# Patient Record
Sex: MALE | Race: WHITE | NOT HISPANIC OR LATINO | Employment: OTHER | URBAN - METROPOLITAN AREA
[De-identification: names, ages, dates, MRNs, and addresses within clinical notes are randomized per-mention and may not be internally consistent; named-entity substitution may affect disease eponyms.]

---

## 2019-10-25 ENCOUNTER — HOSPITAL ENCOUNTER (EMERGENCY)
Facility: HOSPITAL | Age: 84
Discharge: HOME | End: 2019-10-25
Attending: EMERGENCY MEDICINE | Admitting: EMERGENCY MEDICINE

## 2019-10-25 ENCOUNTER — APPOINTMENT (OUTPATIENT)
Dept: CT IMAGING | Facility: HOSPITAL | Age: 84
End: 2019-10-25

## 2019-10-25 VITALS
SYSTOLIC BLOOD PRESSURE: 151 MMHG | BODY MASS INDEX: 27.15 KG/M2 | WEIGHT: 173 LBS | TEMPERATURE: 97.2 F | HEIGHT: 67 IN | DIASTOLIC BLOOD PRESSURE: 66 MMHG | HEART RATE: 96 BPM | RESPIRATION RATE: 18 BRPM | OXYGEN SATURATION: 98 %

## 2019-10-25 DIAGNOSIS — S51.011A SKIN TEAR OF RIGHT ELBOW WITHOUT COMPLICATION, INITIAL ENCOUNTER: ICD-10-CM

## 2019-10-25 DIAGNOSIS — S00.01XA ABRASION OF SCALP, INITIAL ENCOUNTER: ICD-10-CM

## 2019-10-25 DIAGNOSIS — Z79.02 ANTIPLATELET OR ANTITHROMBOTIC LONG-TERM USE: ICD-10-CM
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
## 2019-10-25 DIAGNOSIS — S09.90XA CLOSED HEAD INJURY, INITIAL ENCOUNTER: Primary | ICD-10-CM

## 2019-10-25 PROCEDURE — 70450 CT HEAD/BRAIN W/O DYE: CPT

## 2019-10-25 PROCEDURE — 99284 EMERGENCY DEPT VISIT MOD MDM: CPT

## 2019-10-25 PROCEDURE — 90715 TDAP VACCINE 7 YRS/> IM: CPT | Performed by: EMERGENCY MEDICINE

## 2019-10-25 PROCEDURE — 72125 CT NECK SPINE W/O DYE: CPT

## 2019-10-25 PROCEDURE — 25010000002 TDAP 5-2.5-18.5 LF-MCG/0.5 SUSPENSION: Performed by: EMERGENCY MEDICINE

## 2019-10-25 PROCEDURE — 90471 IMMUNIZATION ADMIN: CPT | Performed by: EMERGENCY MEDICINE

## 2019-10-25 RX ADMIN — TETANUS TOXOID, REDUCED DIPHTHERIA TOXOID AND ACELLULAR PERTUSSIS VACCINE, ADSORBED 0.5 ML: 5; 2.5; 8; 8; 2.5 SUSPENSION INTRAMUSCULAR at 09:58

## 2019-10-25 NOTE — ED NOTES
Cervical collar removed. Pt denies pain. No distress noted at this time     Brooklynn Rodriguez, RN  10/25/19 6077

## 2019-10-25 NOTE — ED TRIAGE NOTES
Pt sent from [facility], staff found him on the ground, pt has hematoma on back of head, skin tear on right elbow. Staff states patient at neuro baseline. Pt is on plavix.

## 2019-10-25 NOTE — ED NOTES
Pt presented to ER per EMS. CC fall. Pt fell at facility; unwitnessed. Unknown LOC. Per family pt bed has been lowered incase of falls. Pt was found 0645 am by staff. Skin tear on right elbow with steri strips intact. No active bleeding noted. Abrasion noted to right posterior scalp. Tenderness noted. No CTL tenderness with palpation. Pt is AO per baseline per family. Pt had a stroke and has right side weakness; HOLGUIN +/=. Daughter at bedside     Brooklynn Rodriguez RN  10/25/19 7308

## 2019-10-25 NOTE — ED PROVIDER NOTES
EMERGENCY DEPARTMENT ENCOUNTER    Room Number:  12/12  Date of encounter:  10/25/2019  PCP: Edil Gilliam MD  Historian: Patient and patient's daughter      HPI:  Chief Complaint: Fall  A complete HPI/ROS/PMH/PSH/SH/FH are unobtainable due to: NA    Context: Jn Beauchamp is a 85 y.o. male who presents from [facility] to the ED for evaluation after reported fall. Patient found by nursing staff on the ground. Patient is unable to recall falling. He denies being in any pain or injuring himself however he presents with skin tear to right arm and hematoma to occipital scalp. Daughter at bedside reports patient's neuro status is at baseline. 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 Tdap not UTD.       PAST MEDICAL HISTORY  Active Ambulatory Problems     Diagnosis Date Noted   • No Active Ambulatory Problems     Resolved Ambulatory Problems     Diagnosis Date Noted   • No Resolved Ambulatory Problems     Past Medical History:   Diagnosis Date   • Cancer (CMS/HCC)    • Diabetes mellitus (CMS/HCC)    • Stroke (CMS/HCC)          PAST SURGICAL HISTORY  Past Surgical History:   Procedure Laterality Date   • ADENOIDECTOMY     • LUNG LOBECTOMY Right     RIGHT UPPER LOBE         FAMILY HISTORY  History reviewed. No pertinent family history.      SOCIAL HISTORY  Social History     Socioeconomic History   • Marital status: Single     Spouse name: Not on file   • Number of children: Not on file   • Years of education: Not on file   • Highest education level: Not on file   Tobacco Use   • Smoking status: Former Smoker   • Smokeless tobacco: Never Used   Substance and Sexual Activity   • Alcohol use: No     Frequency: Never   • Drug use: No   • Sexual activity: Not Currently         ALLERGIES  Patient has no known allergies.        REVIEW OF SYSTEMS  Review of Systems   All systems reviewed and negative except for those discussed in HPI.       PHYSICAL EXAM    I have reviewed the triage vital signs  and nursing notes.    ED Triage Vitals [10/25/19 0842]   Temp Heart Rate Resp BP SpO2   97.2 °F (36.2 °C) 99 18 (!) 192/90 99 %      Temp src Heart Rate Source Patient Position BP Location FiO2 (%)   Tympanic -- -- -- --       Physical Exam  GENERAL: not distressed  HENT: nares patent. Abrasion to right occiput  EYES: no scleral icterus  CV: regular rhythm, regular rate. No chest wall tenderness  RESPIRATORY: normal effort  ABDOMEN: soft, NT  MUSCULOSKELETAL: no deformity. Pelvis stable. No T or L spine tenderness. No pain with palpation to extremities.   NEURO: alert, moves all extremities, follows commands  SKIN: warm, dry. Skin tear to right elbow.       RADIOLOGY  Ct Head Without Contrast    Result Date: 10/25/2019  EMERGENCY NONCONTRAST HEAD CT AND NONCONTRAST CERVICAL SPINE CT 10/25/2019  CLINICAL HISTORY: Patient fell, hit head, abrasion to right occipital region, has headache and neck pain.  HEAD CT  TECHNIQUE: Spiral CT images were obtained from the base of the skull through the vertex without intravenous contrast. Images were reformatted and are submitted in 3 mm thick axial CT sections were brain algorithm. 2 mm thick axial CT sections with high-resolution bone algorithm and 2 mm thick sagittal and coronal reconstructions were performed and submitted in brain algorithm.  There are no prior head CTs for comparison.  FINDINGS: There are patchy areas of low-density extending from periventricular into the subcortical white matter of the cerebral hemispheres consistent with mild-to-moderate small vessel disease. There is a focal area of encephalomalacia in the superior left frontal gyrus compatible with an old superior left frontal lobe infarct measuring 13 x 10 mm in size in the distribution of the superior frontal branches of left middle cerebral artery territory and there is an additional 16 x 14 x 12 mm area of encephalomalacia in the superior lateral left occipital lobe in the distribution of the superior  lateral occipital branches of the inferior division left middle cerebral artery territory. There is a cystic area of encephalomalacia tracking throughout the left putamen, some extension into the anterior limb of the left internal capsule and the left external capsule and the area measures 3.6 x 1 x 1.9 cm in anteroposterior, mediolateral and craniocaudal dimension compatible with sequelae of an old infarct or hemorrhage at this site. There is diffuse cerebral atrophy via volume loss in the left side. The left lateral ventricle is larger than the right and ventricles are mildly prominent in size felt to be due to central volume loss or atrophy. I see no mass effect, no midline shift and no extra-axial fluid collections are identified. There is no evidence of acute intracranial hemorrhage. No acute skull fracture is identified. Paranasal sinuses and mastoid air cells and middle ear cavities are clear. Calcified plaques are present in the cavernous segments of the internal carotid arteries bilaterally as well as the intracranial segments of the distal vertebral arteries.       1. No acute abnormality is seen. Specifically no acute skull fracture or intracranial hemorrhage is identified.  2. There is mild-to-moderate small vessel disease in the cerebral white matter. There is a 13 x 10 mm old superior left frontal cortical infarct in the left middle cerebral artery territory and an additional 16 x 14 mm old superior lateral left occipital lobe infarct in the left middle cerebral artery territory. There is cystic area of encephalomalacia tracking throughout the majority of the left putamen into the anterior limb of the left internal capsule and left external capsule that measures 3.6 x 1 x 1.8 cm and is the sequela of an old infarct or old hemorrhage. Correlation with clinical history is suggested.  3. There is diffuse cerebral atrophy and prominent calcified plaques in the intracranial segments of the distal vertebral  arteries and cavernous and supracavernous segments of the internal carotid arteries bilaterally. The remainder of the head CT is within normal limits.   CERVICAL SPINE CT  TECHNIQUE: Spiral CT images were obtained from the skull base down to the T2 thoracic level and images were reformatted and submitted in 2 mm thick axial, sagittal and coronal CT sections with soft tissue algorithm and 1 mm thick axial, sagittal and coronal CT sections with high-resolution bone algorithm.  FINDINGS: The craniocervical junction is normal in appearance. There are arthritic changes at the atlantodental interval with marginal spurring off the anterior ring of C1 and the odontoid process. Otherwise, the C1-2 level is normal in appearance.  At C2-3 the disc space is normal. There is mild-to-moderate right facet overgrowth. The left facets and uncovertebral joints are normal and there is no canal or foraminal narrowing.  At C3-4 there is mild right and there is moderate left facet overgrowth, minimal posterior spurring and mild bilateral uncovertebral joint hypertrophy. There is mild canal and there is mild right and moderate left bony foraminal narrowing.  At C4-5 there is minimal left and there is moderate right facet overgrowth, mild posterior endplate spurring. There is mild canal and mild-to-moderate right bony foraminal narrowing.  At C5-6 there is minimal left and there is moderate-to-severe right facet overgrowth, mild disc space narrowing and degenerative endplate changes. Posterior disc osteophyte complex mild to moderately narrows the canal. There appears to be a left posterolateral to medial foraminal disc herniation that presses on the the left C6 nerve root as it extends from the cord toward the foramen, compresses the C6 nerve root as it enters the left foramen at C5-6. There is also moderate right bony foraminal narrowing.  At C6-7 there is calcification of the disc space, some bony bridging across the endplates that may  be degenerative in origin or possibly related to prior surgery. There is some calcification of the posterior longitudinal ligament along the midline of the posterior inferior body of C6 and posterior superior body of C7. There is mild-to-moderate narrowing of the central portion of the canal, some uncovertebral joint hypertrophy. The facets are normal. There is mild right and mild-to-moderate left bony foraminal narrowing.  At C7-T1 there is mild right facet overgrowth. The posterior disc margin is normal. There is no canal or foraminal narrowing.  No acute fracture seen in the cervical spine.  IMPRESSION: No acute fracture is seen in cervical spine. There is cervical spondylosis as described above.  The results were communicated to Dr. Elvin Trejo from the emergency room by telephone 10/25/2019 at 10:20 AM.  Radiation dose reduction techniques were utilized, including automated exposure control and exposure modulation based on body size.  This report was finalized on 10/25/2019 2:40 PM by Dr. Kenny Melgar M.D.      Ct Cervical Spine Without Contrast    Result Date: 10/25/2019  EMERGENCY NONCONTRAST HEAD CT AND NONCONTRAST CERVICAL SPINE CT 10/25/2019  CLINICAL HISTORY: Patient fell, hit head, abrasion to right occipital region, has headache and neck pain.  HEAD CT  TECHNIQUE: Spiral CT images were obtained from the base of the skull through the vertex without intravenous contrast. Images were reformatted and are submitted in 3 mm thick axial CT sections were brain algorithm. 2 mm thick axial CT sections with high-resolution bone algorithm and 2 mm thick sagittal and coronal reconstructions were performed and submitted in brain algorithm.  There are no prior head CTs for comparison.  FINDINGS: There are patchy areas of low-density extending from periventricular into the subcortical white matter of the cerebral hemispheres consistent with mild-to-moderate small vessel disease. There is a focal area of  encephalomalacia in the superior left frontal gyrus compatible with an old superior left frontal lobe infarct measuring 13 x 10 mm in size in the distribution of the superior frontal branches of left middle cerebral artery territory and there is an additional 16 x 14 x 12 mm area of encephalomalacia in the superior lateral left occipital lobe in the distribution of the superior lateral occipital branches of the inferior division left middle cerebral artery territory. There is a cystic area of encephalomalacia tracking throughout the left putamen, some extension into the anterior limb of the left internal capsule and the left external capsule and the area measures 3.6 x 1 x 1.9 cm in anteroposterior, mediolateral and craniocaudal dimension compatible with sequelae of an old infarct or hemorrhage at this site. There is diffuse cerebral atrophy via volume loss in the left side. The left lateral ventricle is larger than the right and ventricles are mildly prominent in size felt to be due to central volume loss or atrophy. I see no mass effect, no midline shift and no extra-axial fluid collections are identified. There is no evidence of acute intracranial hemorrhage. No acute skull fracture is identified. Paranasal sinuses and mastoid air cells and middle ear cavities are clear. Calcified plaques are present in the cavernous segments of the internal carotid arteries bilaterally as well as the intracranial segments of the distal vertebral arteries.       1. No acute abnormality is seen. Specifically no acute skull fracture or intracranial hemorrhage is identified.  2. There is mild-to-moderate small vessel disease in the cerebral white matter. There is a 13 x 10 mm old superior left frontal cortical infarct in the left middle cerebral artery territory and an additional 16 x 14 mm old superior lateral left occipital lobe infarct in the left middle cerebral artery territory. There is cystic area of encephalomalacia tracking  throughout the majority of the left putamen into the anterior limb of the left internal capsule and left external capsule that measures 3.6 x 1 x 1.8 cm and is the sequela of an old infarct or old hemorrhage. Correlation with clinical history is suggested.  3. There is diffuse cerebral atrophy and prominent calcified plaques in the intracranial segments of the distal vertebral arteries and cavernous and supracavernous segments of the internal carotid arteries bilaterally. The remainder of the head CT is within normal limits.   CERVICAL SPINE CT  TECHNIQUE: Spiral CT images were obtained from the skull base down to the T2 thoracic level and images were reformatted and submitted in 2 mm thick axial, sagittal and coronal CT sections with soft tissue algorithm and 1 mm thick axial, sagittal and coronal CT sections with high-resolution bone algorithm.  FINDINGS: The craniocervical junction is normal in appearance. There are arthritic changes at the atlantodental interval with marginal spurring off the anterior ring of C1 and the odontoid process. Otherwise, the C1-2 level is normal in appearance.  At C2-3 the disc space is normal. There is mild-to-moderate right facet overgrowth. The left facets and uncovertebral joints are normal and there is no canal or foraminal narrowing.  At C3-4 there is mild right and there is moderate left facet overgrowth, minimal posterior spurring and mild bilateral uncovertebral joint hypertrophy. There is mild canal and there is mild right and moderate left bony foraminal narrowing.  At C4-5 there is minimal left and there is moderate right facet overgrowth, mild posterior endplate spurring. There is mild canal and mild-to-moderate right bony foraminal narrowing.  At C5-6 there is minimal left and there is moderate-to-severe right facet overgrowth, mild disc space narrowing and degenerative endplate changes. Posterior disc osteophyte complex mild to moderately narrows the canal. There  appears to be a left posterolateral to medial foraminal disc herniation that presses on the the left C6 nerve root as it extends from the cord toward the foramen, compresses the C6 nerve root as it enters the left foramen at C5-6. There is also moderate right bony foraminal narrowing.  At C6-7 there is calcification of the disc space, some bony bridging across the endplates that may be degenerative in origin or possibly related to prior surgery. There is some calcification of the posterior longitudinal ligament along the midline of the posterior inferior body of C6 and posterior superior body of C7. There is mild-to-moderate narrowing of the central portion of the canal, some uncovertebral joint hypertrophy. The facets are normal. There is mild right and mild-to-moderate left bony foraminal narrowing.  At C7-T1 there is mild right facet overgrowth. The posterior disc margin is normal. There is no canal or foraminal narrowing.  No acute fracture seen in the cervical spine.  IMPRESSION: No acute fracture is seen in cervical spine. There is cervical spondylosis as described above.  The results were communicated to Dr. Elvin Trejo from the emergency room by telephone 10/25/2019 at 10:20 AM.  Radiation dose reduction techniques were utilized, including automated exposure control and exposure modulation based on body size.  This report was finalized on 10/25/2019 2:40 PM by Dr. Kenny Melgar M.D.        I ordered the above noted radiological studies. Reviewed by me and discussed with radiologist.  See dictation for official radiology interpretation.      PROCEDURES    Procedures      MEDICATIONS GIVEN IN ER    Medications   Tdap (BOOSTRIX) injection 0.5 mL (0.5 mL Intramuscular Given 10/25/19 0958)         PROGRESS, DATA ANALYSIS, CONSULTS, AND MEDICAL DECISION MAKING    All labs have been independently reviewed by me.  All radiology studies have been reviewed by me and discussed with radiologist dictating the  report.   EKG's independently viewed and interpreted by me.  Discussion below represents my analysis of pertinent findings related to patient's condition, differential diagnosis, treatment plan and final disposition.      ED Course as of Oct 25 1106   Fri Oct 25, 2019   0858 I interpreted the prehospital EKG.  This is obtained at 8:10 AM.  Sinus rhythm right bundle branch block.  Normal axis.  No acute ST changes.  [TD]   1102 I spoke to Dr. Mon who states that CT scan of the head and neck are acutely negative.  Patient does have a cervical disc herniation.  [TD]   1104 CT head interpreted by myself shows no evidence of any intracranial pathology.  [TD]      ED Course User Index  [TD] Elvin Trejo II, MD     I let the patient know about the incidental herniated disk in C spine. He states he has no neck pain. He reports good strength on exam and I rechecked his exam and he has excellent strength in arms at deltoids, biceps, triceps, and interosseus muscles.     AS OF 11:06 AM VITALS:    BP - 151/66  HR - 96  TEMP - 97.2 °F (36.2 °C) (Tympanic)  02 SATS - 98%        DIAGNOSIS  Final diagnoses:   Closed head injury, initial encounter   Fall, initial encounter   Abrasion of scalp, initial encounter   Antiplatelet or antithrombotic long-term use   Skin tear of right elbow without complication, initial encounter         DISPOSITION  DISCHARGE    Patient discharged in stable condition.    Reviewed implications of results, diagnosis, meds, responsibility to follow up, warning signs and symptoms of possible worsening, potential complications and reasons to return to ER.    Patient/Family voiced understanding of above instructions.    Discussed plan for discharge, as there is no emergent indication for admission. Patient referred to primary care provider for BP management due to today's BP. Pt/family is agreeable and understands need for follow up and repeat testing.  Pt is aware that discharge does not mean that  nothing is wrong but it indicates no emergency is present that requires admission and they must continue care with follow-up as given below or physician of their choice.     FOLLOW-UP  Edil Gilliam MD  [address]  [place] [id number] [phone]    Schedule an appointment as soon as possible for a visit   As needed         Medication List      No changes were made to your prescriptions during this visit.             Documentation assistance provided by blake Davis for Dr. Elvin Trejo MD.  Information recorded by the blake was done at my direction and has been verified and validated by me.           Cyndi Davis  10/25/19 1106       Elvin Trejo II, MD  10/26/19 5653

## 2020-09-10 ENCOUNTER — LAB REQUISITION (OUTPATIENT)
Dept: LAB | Facility: HOSPITAL | Age: 85
End: 2020-09-10

## 2020-09-10 DIAGNOSIS — Z00.00 ROUTINE GENERAL MEDICAL EXAMINATION AT A HEALTH CARE FACILITY: ICD-10-CM

## 2020-09-10 LAB
ALBUMIN SERPL-MCNC: 3.5 G/DL (ref 3.5–5.2)
ALBUMIN/GLOB SERPL: 1.4 G/DL
ALP SERPL-CCNC: 117 U/L (ref 39–117)
ALT SERPL W P-5'-P-CCNC: 11 U/L (ref 1–41)
ANION GAP SERPL CALCULATED.3IONS-SCNC: 11.4 MMOL/L (ref 5–15)
AST SERPL-CCNC: 8 U/L (ref 1–40)
BASOPHILS # BLD AUTO: 0.05 10*3/MM3 (ref 0–0.2)
BASOPHILS NFR BLD AUTO: 0.7 % (ref 0–1.5)
BILIRUB SERPL-MCNC: 0.3 MG/DL (ref 0–1.2)
BUN SERPL-MCNC: 20 MG/DL (ref 8–23)
BUN/CREAT SERPL: 16.8 (ref 7–25)
CALCIUM SPEC-SCNC: 9.4 MG/DL (ref 8.6–10.5)
CHLORIDE SERPL-SCNC: 103 MMOL/L (ref 98–107)
CO2 SERPL-SCNC: 26.6 MMOL/L (ref 22–29)
CREAT SERPL-MCNC: 1.19 MG/DL (ref 0.76–1.27)
DEPRECATED RDW RBC AUTO: 43.5 FL (ref 37–54)
EOSINOPHIL # BLD AUTO: 0.31 10*3/MM3 (ref 0–0.4)
EOSINOPHIL NFR BLD AUTO: 4.3 % (ref 0.3–6.2)
ERYTHROCYTE [DISTWIDTH] IN BLOOD BY AUTOMATED COUNT: 14 % (ref 12.3–15.4)
GFR SERPL CREATININE-BSD FRML MDRD: 58 ML/MIN/1.73
GLOBULIN UR ELPH-MCNC: 2.5 GM/DL
GLUCOSE SERPL-MCNC: 92 MG/DL (ref 65–99)
HCT VFR BLD AUTO: 36.1 % (ref 37.5–51)
HGB BLD-MCNC: 11 G/DL (ref 13–17.7)
IMM GRANULOCYTES # BLD AUTO: 0.01 10*3/MM3 (ref 0–0.05)
IMM GRANULOCYTES NFR BLD AUTO: 0.1 % (ref 0–0.5)
LYMPHOCYTES # BLD AUTO: 1.22 10*3/MM3 (ref 0.7–3.1)
LYMPHOCYTES NFR BLD AUTO: 16.8 % (ref 19.6–45.3)
MCH RBC QN AUTO: 25.5 PG (ref 26.6–33)
MCHC RBC AUTO-ENTMCNC: 30.5 G/DL (ref 31.5–35.7)
MCV RBC AUTO: 83.8 FL (ref 79–97)
MONOCYTES # BLD AUTO: 0.43 10*3/MM3 (ref 0.1–0.9)
MONOCYTES NFR BLD AUTO: 5.9 % (ref 5–12)
NEUTROPHILS NFR BLD AUTO: 5.23 10*3/MM3 (ref 1.7–7)
NEUTROPHILS NFR BLD AUTO: 72.2 % (ref 42.7–76)
NRBC BLD AUTO-RTO: 0 /100 WBC (ref 0–0.2)
PLATELET # BLD AUTO: 194 10*3/MM3 (ref 140–450)
PMV BLD AUTO: 12.5 FL (ref 6–12)
POTASSIUM SERPL-SCNC: 3.5 MMOL/L (ref 3.5–5.2)
PROT SERPL-MCNC: 6 G/DL (ref 6–8.5)
RBC # BLD AUTO: 4.31 10*6/MM3 (ref 4.14–5.8)
SODIUM SERPL-SCNC: 141 MMOL/L (ref 136–145)
WBC # BLD AUTO: 7.25 10*3/MM3 (ref 3.4–10.8)

## 2020-09-10 PROCEDURE — 80053 COMPREHEN METABOLIC PANEL: CPT

## 2020-09-10 PROCEDURE — 85025 COMPLETE CBC W/AUTO DIFF WBC: CPT

## 2020-11-22 ENCOUNTER — LAB REQUISITION (OUTPATIENT)
Dept: LAB | Facility: HOSPITAL | Age: 85
End: 2020-11-22

## 2020-11-22 DIAGNOSIS — Z00.00 ROUTINE GENERAL MEDICAL EXAMINATION AT A HEALTH CARE FACILITY: ICD-10-CM

## 2020-11-22 LAB
ALBUMIN SERPL-MCNC: 3.5 G/DL (ref 3.5–5.2)
ALBUMIN/GLOB SERPL: 1.2 G/DL
ALP SERPL-CCNC: 125 U/L (ref 39–117)
ALT SERPL W P-5'-P-CCNC: 8 U/L (ref 1–41)
ANION GAP SERPL CALCULATED.3IONS-SCNC: 21.7 MMOL/L (ref 5–15)
AST SERPL-CCNC: 7 U/L (ref 1–40)
BASOPHILS # BLD AUTO: 0.06 10*3/MM3 (ref 0–0.2)
BASOPHILS NFR BLD AUTO: 0.4 % (ref 0–1.5)
BILIRUB SERPL-MCNC: 0.3 MG/DL (ref 0–1.2)
BUN SERPL-MCNC: 70 MG/DL (ref 8–23)
BUN/CREAT SERPL: 17.6 (ref 7–25)
CALCIUM SPEC-SCNC: 9.4 MG/DL (ref 8.6–10.5)
CHLORIDE SERPL-SCNC: 111 MMOL/L (ref 98–107)
CO2 SERPL-SCNC: 22.3 MMOL/L (ref 22–29)
CREAT SERPL-MCNC: 3.98 MG/DL (ref 0.76–1.27)
DEPRECATED RDW RBC AUTO: 48.5 FL (ref 37–54)
EOSINOPHIL # BLD AUTO: 0 10*3/MM3 (ref 0–0.4)
EOSINOPHIL NFR BLD AUTO: 0 % (ref 0.3–6.2)
ERYTHROCYTE [DISTWIDTH] IN BLOOD BY AUTOMATED COUNT: 14.9 % (ref 12.3–15.4)
ERYTHROCYTE [SEDIMENTATION RATE] IN BLOOD: 58 MM/HR (ref 0–20)
GFR SERPL CREATININE-BSD FRML MDRD: 14 ML/MIN/1.73
GFR SERPL CREATININE-BSD FRML MDRD: 17 ML/MIN/1.73
GLOBULIN UR ELPH-MCNC: 2.9 GM/DL
GLUCOSE SERPL-MCNC: 526 MG/DL (ref 65–99)
HCT VFR BLD AUTO: 39.8 % (ref 37.5–51)
HGB BLD-MCNC: 11.6 G/DL (ref 13–17.7)
IMM GRANULOCYTES # BLD AUTO: 0.12 10*3/MM3 (ref 0–0.05)
IMM GRANULOCYTES NFR BLD AUTO: 0.8 % (ref 0–0.5)
LYMPHOCYTES # BLD AUTO: 1.11 10*3/MM3 (ref 0.7–3.1)
LYMPHOCYTES NFR BLD AUTO: 7.5 % (ref 19.6–45.3)
MCH RBC QN AUTO: 25.4 PG (ref 26.6–33)
MCHC RBC AUTO-ENTMCNC: 29.1 G/DL (ref 31.5–35.7)
MCV RBC AUTO: 87.1 FL (ref 79–97)
MONOCYTES # BLD AUTO: 0.56 10*3/MM3 (ref 0.1–0.9)
MONOCYTES NFR BLD AUTO: 3.8 % (ref 5–12)
NEUTROPHILS NFR BLD AUTO: 12.94 10*3/MM3 (ref 1.7–7)
NEUTROPHILS NFR BLD AUTO: 87.5 % (ref 42.7–76)
NRBC BLD AUTO-RTO: 0 /100 WBC (ref 0–0.2)
PLATELET # BLD AUTO: 369 10*3/MM3 (ref 140–450)
PMV BLD AUTO: 12.6 FL (ref 6–12)
POTASSIUM SERPL-SCNC: 4.6 MMOL/L (ref 3.5–5.2)
PROT SERPL-MCNC: 6.4 G/DL (ref 6–8.5)
RBC # BLD AUTO: 4.57 10*6/MM3 (ref 4.14–5.8)
SODIUM SERPL-SCNC: 155 MMOL/L (ref 136–145)
WBC # BLD AUTO: 14.79 10*3/MM3 (ref 3.4–10.8)

## 2020-11-22 PROCEDURE — 85025 COMPLETE CBC W/AUTO DIFF WBC: CPT

## 2020-11-22 PROCEDURE — 80053 COMPREHEN METABOLIC PANEL: CPT

## 2020-11-22 PROCEDURE — 85652 RBC SED RATE AUTOMATED: CPT

## 2021-02-06 ENCOUNTER — HOSPITAL ENCOUNTER (INPATIENT)
Facility: HOSPITAL | Age: 86
LOS: 6 days | Discharge: HOSPICE | End: 2021-02-12
Attending: EMERGENCY MEDICINE | Admitting: INTERNAL MEDICINE

## 2021-02-06 DIAGNOSIS — N39.0 ACUTE UTI: ICD-10-CM

## 2021-02-06 DIAGNOSIS — F03.91 DEMENTIA WITH BEHAVIORAL DISTURBANCE, UNSPECIFIED DEMENTIA TYPE: Primary | ICD-10-CM

## 2021-02-06 PROBLEM — F03.918 DEMENTIA WITH BEHAVIORAL DISTURBANCE (HCC): Status: ACTIVE | Noted: 2021-02-06

## 2021-02-06 LAB
ALBUMIN SERPL-MCNC: 3.7 G/DL (ref 3.5–5.2)
ALBUMIN/GLOB SERPL: 1.4 G/DL
ALP SERPL-CCNC: 108 U/L (ref 39–117)
ALT SERPL W P-5'-P-CCNC: 6 U/L (ref 1–41)
AMPHET+METHAMPHET UR QL: NEGATIVE
ANION GAP SERPL CALCULATED.3IONS-SCNC: 10.8 MMOL/L (ref 5–15)
AST SERPL-CCNC: 12 U/L (ref 1–40)
BACTERIA UR QL AUTO: ABNORMAL /HPF
BARBITURATES UR QL SCN: NEGATIVE
BASOPHILS # BLD AUTO: 0.07 10*3/MM3 (ref 0–0.2)
BASOPHILS NFR BLD AUTO: 0.8 % (ref 0–1.5)
BENZODIAZ UR QL SCN: NEGATIVE
BILIRUB SERPL-MCNC: <0.2 MG/DL (ref 0–1.2)
BILIRUB UR QL STRIP: NEGATIVE
BUN SERPL-MCNC: 35 MG/DL (ref 8–23)
BUN/CREAT SERPL: 17.1 (ref 7–25)
CALCIUM SPEC-SCNC: 9.5 MG/DL (ref 8.6–10.5)
CANNABINOIDS SERPL QL: NEGATIVE
CHLORIDE SERPL-SCNC: 109 MMOL/L (ref 98–107)
CLARITY UR: ABNORMAL
CO2 SERPL-SCNC: 24.2 MMOL/L (ref 22–29)
COCAINE UR QL: NEGATIVE
COLOR UR: YELLOW
CREAT SERPL-MCNC: 2.05 MG/DL (ref 0.76–1.27)
DEPRECATED RDW RBC AUTO: 46.2 FL (ref 37–54)
EOSINOPHIL # BLD AUTO: 0.19 10*3/MM3 (ref 0–0.4)
EOSINOPHIL NFR BLD AUTO: 2.1 % (ref 0.3–6.2)
ERYTHROCYTE [DISTWIDTH] IN BLOOD BY AUTOMATED COUNT: 14.7 % (ref 12.3–15.4)
ETHANOL BLD-MCNC: <10 MG/DL (ref 0–10)
ETHANOL UR QL: <0.01 %
GFR SERPL CREATININE-BSD FRML MDRD: 31 ML/MIN/1.73
GLOBULIN UR ELPH-MCNC: 2.7 GM/DL
GLUCOSE SERPL-MCNC: 224 MG/DL (ref 65–99)
GLUCOSE UR STRIP-MCNC: ABNORMAL MG/DL
HCT VFR BLD AUTO: 35.4 % (ref 37.5–51)
HGB BLD-MCNC: 11.1 G/DL (ref 13–17.7)
HGB UR QL STRIP.AUTO: ABNORMAL
HYALINE CASTS UR QL AUTO: ABNORMAL /LPF
IMM GRANULOCYTES # BLD AUTO: 0.04 10*3/MM3 (ref 0–0.05)
IMM GRANULOCYTES NFR BLD AUTO: 0.4 % (ref 0–0.5)
KETONES UR QL STRIP: NEGATIVE
LEUKOCYTE ESTERASE UR QL STRIP.AUTO: ABNORMAL
LYMPHOCYTES # BLD AUTO: 1.79 10*3/MM3 (ref 0.7–3.1)
LYMPHOCYTES NFR BLD AUTO: 19.5 % (ref 19.6–45.3)
MCH RBC QN AUTO: 26.9 PG (ref 26.6–33)
MCHC RBC AUTO-ENTMCNC: 31.4 G/DL (ref 31.5–35.7)
MCV RBC AUTO: 85.9 FL (ref 79–97)
METHADONE UR QL SCN: NEGATIVE
MONOCYTES # BLD AUTO: 0.49 10*3/MM3 (ref 0.1–0.9)
MONOCYTES NFR BLD AUTO: 5.3 % (ref 5–12)
NEUTROPHILS NFR BLD AUTO: 6.62 10*3/MM3 (ref 1.7–7)
NEUTROPHILS NFR BLD AUTO: 71.9 % (ref 42.7–76)
NITRITE UR QL STRIP: NEGATIVE
NRBC BLD AUTO-RTO: 0 /100 WBC (ref 0–0.2)
OPIATES UR QL: NEGATIVE
OXYCODONE UR QL SCN: NEGATIVE
PH UR STRIP.AUTO: <=5 [PH] (ref 5–8)
PLATELET # BLD AUTO: 234 10*3/MM3 (ref 140–450)
PMV BLD AUTO: 10.6 FL (ref 6–12)
POTASSIUM SERPL-SCNC: 4.5 MMOL/L (ref 3.5–5.2)
PROT SERPL-MCNC: 6.4 G/DL (ref 6–8.5)
PROT UR QL STRIP: ABNORMAL
RBC # BLD AUTO: 4.12 10*6/MM3 (ref 4.14–5.8)
RBC # UR: ABNORMAL /HPF
REF LAB TEST METHOD: ABNORMAL
SODIUM SERPL-SCNC: 144 MMOL/L (ref 136–145)
SP GR UR STRIP: 1.02 (ref 1–1.03)
SQUAMOUS #/AREA URNS HPF: ABNORMAL /HPF
UROBILINOGEN UR QL STRIP: ABNORMAL
WBC # BLD AUTO: 9.2 10*3/MM3 (ref 3.4–10.8)
WBC UR QL AUTO: ABNORMAL /HPF
YEAST URNS QL MICRO: ABNORMAL /HPF

## 2021-02-06 PROCEDURE — 81001 URINALYSIS AUTO W/SCOPE: CPT | Performed by: EMERGENCY MEDICINE

## 2021-02-06 PROCEDURE — 80307 DRUG TEST PRSMV CHEM ANLYZR: CPT | Performed by: EMERGENCY MEDICINE

## 2021-02-06 PROCEDURE — 85025 COMPLETE CBC W/AUTO DIFF WBC: CPT | Performed by: EMERGENCY MEDICINE

## 2021-02-06 PROCEDURE — 51798 US URINE CAPACITY MEASURE: CPT

## 2021-02-06 PROCEDURE — 99284 EMERGENCY DEPT VISIT MOD MDM: CPT

## 2021-02-06 PROCEDURE — 25010000002 CEFTRIAXONE PER 250 MG: Performed by: EMERGENCY MEDICINE

## 2021-02-06 PROCEDURE — 82077 ASSAY SPEC XCP UR&BREATH IA: CPT | Performed by: EMERGENCY MEDICINE

## 2021-02-06 PROCEDURE — 80053 COMPREHEN METABOLIC PANEL: CPT | Performed by: EMERGENCY MEDICINE

## 2021-02-06 PROCEDURE — P9612 CATHETERIZE FOR URINE SPEC: HCPCS

## 2021-02-06 PROCEDURE — U0004 COV-19 TEST NON-CDC HGH THRU: HCPCS | Performed by: EMERGENCY MEDICINE

## 2021-02-06 PROCEDURE — 87086 URINE CULTURE/COLONY COUNT: CPT | Performed by: EMERGENCY MEDICINE

## 2021-02-06 RX ORDER — SODIUM CHLORIDE 0.9 % (FLUSH) 0.9 %
10 SYRINGE (ML) INJECTION AS NEEDED
Status: DISCONTINUED | OUTPATIENT
Start: 2021-02-06 | End: 2021-02-12 | Stop reason: HOSPADM

## 2021-02-06 RX ORDER — CEFTRIAXONE SODIUM 1 G/50ML
1 INJECTION, SOLUTION INTRAVENOUS ONCE
Status: COMPLETED | OUTPATIENT
Start: 2021-02-06 | End: 2021-02-06

## 2021-02-06 RX ADMIN — CEFTRIAXONE SODIUM 1 G: 1 INJECTION, SOLUTION INTRAVENOUS at 23:09

## 2021-02-07 PROBLEM — K21.9 GERD (GASTROESOPHAGEAL REFLUX DISEASE): Status: ACTIVE | Noted: 2021-02-07

## 2021-02-07 PROBLEM — E11.65 TYPE 2 DIABETES MELLITUS WITH HYPERGLYCEMIA, WITHOUT LONG-TERM CURRENT USE OF INSULIN (HCC): Status: ACTIVE | Noted: 2021-02-07

## 2021-02-07 PROBLEM — I48.0 PAROXYSMAL ATRIAL FIBRILLATION (HCC): Status: ACTIVE | Noted: 2021-02-07

## 2021-02-07 PROBLEM — I73.9 PVD (PERIPHERAL VASCULAR DISEASE) (HCC): Status: ACTIVE | Noted: 2021-02-07

## 2021-02-07 PROBLEM — Z86.73 HISTORY OF CVA (CEREBROVASCULAR ACCIDENT): Status: ACTIVE | Noted: 2021-02-07

## 2021-02-07 PROBLEM — J44.9 COPD (CHRONIC OBSTRUCTIVE PULMONARY DISEASE) (HCC): Status: ACTIVE | Noted: 2021-02-07

## 2021-02-07 PROBLEM — E78.5 HLD (HYPERLIPIDEMIA): Status: ACTIVE | Noted: 2021-02-07

## 2021-02-07 LAB
ANION GAP SERPL CALCULATED.3IONS-SCNC: 10.5 MMOL/L (ref 5–15)
BUN SERPL-MCNC: 32 MG/DL (ref 8–23)
BUN/CREAT SERPL: 17.5 (ref 7–25)
CALCIUM SPEC-SCNC: 9.5 MG/DL (ref 8.6–10.5)
CHLORIDE SERPL-SCNC: 111 MMOL/L (ref 98–107)
CO2 SERPL-SCNC: 23.5 MMOL/L (ref 22–29)
CREAT SERPL-MCNC: 1.83 MG/DL (ref 0.76–1.27)
DEPRECATED RDW RBC AUTO: 45 FL (ref 37–54)
ERYTHROCYTE [DISTWIDTH] IN BLOOD BY AUTOMATED COUNT: 14.8 % (ref 12.3–15.4)
GFR SERPL CREATININE-BSD FRML MDRD: 35 ML/MIN/1.73
GLUCOSE BLDC GLUCOMTR-MCNC: 130 MG/DL (ref 70–130)
GLUCOSE BLDC GLUCOMTR-MCNC: 153 MG/DL (ref 70–130)
GLUCOSE BLDC GLUCOMTR-MCNC: 158 MG/DL (ref 70–130)
GLUCOSE BLDC GLUCOMTR-MCNC: 189 MG/DL (ref 70–130)
GLUCOSE BLDC GLUCOMTR-MCNC: 233 MG/DL (ref 70–130)
GLUCOSE BLDC GLUCOMTR-MCNC: 247 MG/DL (ref 70–130)
GLUCOSE BLDC GLUCOMTR-MCNC: 51 MG/DL (ref 70–130)
GLUCOSE BLDC GLUCOMTR-MCNC: 55 MG/DL (ref 70–130)
GLUCOSE SERPL-MCNC: 45 MG/DL (ref 65–99)
HBA1C MFR BLD: 8.4 % (ref 4.8–5.6)
HCT VFR BLD AUTO: 31.5 % (ref 37.5–51)
HGB BLD-MCNC: 10.5 G/DL (ref 13–17.7)
MCH RBC QN AUTO: 28 PG (ref 26.6–33)
MCHC RBC AUTO-ENTMCNC: 33.3 G/DL (ref 31.5–35.7)
MCV RBC AUTO: 84 FL (ref 79–97)
PLATELET # BLD AUTO: 224 10*3/MM3 (ref 140–450)
PMV BLD AUTO: 10.4 FL (ref 6–12)
POTASSIUM SERPL-SCNC: 3.4 MMOL/L (ref 3.5–5.2)
RBC # BLD AUTO: 3.75 10*6/MM3 (ref 4.14–5.8)
SARS-COV-2 ORF1AB RESP QL NAA+PROBE: NOT DETECTED
SODIUM SERPL-SCNC: 145 MMOL/L (ref 136–145)
WBC # BLD AUTO: 8.79 10*3/MM3 (ref 3.4–10.8)

## 2021-02-07 PROCEDURE — 83036 HEMOGLOBIN GLYCOSYLATED A1C: CPT | Performed by: NURSE PRACTITIONER

## 2021-02-07 PROCEDURE — 25010000002 CEFTRIAXONE PER 250 MG: Performed by: NURSE PRACTITIONER

## 2021-02-07 PROCEDURE — 82962 GLUCOSE BLOOD TEST: CPT

## 2021-02-07 PROCEDURE — 80048 BASIC METABOLIC PNL TOTAL CA: CPT | Performed by: NURSE PRACTITIONER

## 2021-02-07 PROCEDURE — 51798 US URINE CAPACITY MEASURE: CPT

## 2021-02-07 PROCEDURE — 85027 COMPLETE CBC AUTOMATED: CPT | Performed by: NURSE PRACTITIONER

## 2021-02-07 PROCEDURE — 63710000001 INSULIN LISPRO (HUMAN) PER 5 UNITS: Performed by: NURSE PRACTITIONER

## 2021-02-07 PROCEDURE — 63710000001 INSULIN GLARGINE PER 5 UNITS: Performed by: NURSE PRACTITIONER

## 2021-02-07 RX ORDER — AMLODIPINE BESYLATE 5 MG/1
5 TABLET ORAL DAILY
Status: DISCONTINUED | OUTPATIENT
Start: 2021-02-07 | End: 2021-02-12 | Stop reason: HOSPADM

## 2021-02-07 RX ORDER — CYPROHEPTADINE HYDROCHLORIDE 2 MG/5ML
2 SOLUTION ORAL EVERY 12 HOURS
COMMUNITY

## 2021-02-07 RX ORDER — CLOPIDOGREL BISULFATE 75 MG/1
75 TABLET ORAL NIGHTLY
COMMUNITY

## 2021-02-07 RX ORDER — ASPIRIN 81 MG/1
81 TABLET, CHEWABLE ORAL DAILY
Status: DISCONTINUED | OUTPATIENT
Start: 2021-02-07 | End: 2021-02-12 | Stop reason: HOSPADM

## 2021-02-07 RX ORDER — TAMSULOSIN HYDROCHLORIDE 0.4 MG/1
1 CAPSULE ORAL DAILY
COMMUNITY

## 2021-02-07 RX ORDER — OMEPRAZOLE 40 MG/1
40 CAPSULE, DELAYED RELEASE ORAL DAILY
COMMUNITY

## 2021-02-07 RX ORDER — INSULIN LISPRO 100 [IU]/ML
0-9 INJECTION, SOLUTION INTRAVENOUS; SUBCUTANEOUS
Status: DISCONTINUED | OUTPATIENT
Start: 2021-02-07 | End: 2021-02-12 | Stop reason: HOSPADM

## 2021-02-07 RX ORDER — ATORVASTATIN CALCIUM 20 MG/1
20 TABLET, FILM COATED ORAL NIGHTLY
COMMUNITY

## 2021-02-07 RX ORDER — CEFTRIAXONE SODIUM 1 G/50ML
1 INJECTION, SOLUTION INTRAVENOUS EVERY 24 HOURS
Status: COMPLETED | OUTPATIENT
Start: 2021-02-07 | End: 2021-02-09

## 2021-02-07 RX ORDER — ACETAMINOPHEN 160 MG/5ML
650 SOLUTION ORAL EVERY 4 HOURS PRN
Status: DISCONTINUED | OUTPATIENT
Start: 2021-02-07 | End: 2021-02-12 | Stop reason: HOSPADM

## 2021-02-07 RX ORDER — SODIUM CHLORIDE 0.9 % (FLUSH) 0.9 %
10 SYRINGE (ML) INJECTION AS NEEDED
Status: DISCONTINUED | OUTPATIENT
Start: 2021-02-07 | End: 2021-02-12 | Stop reason: HOSPADM

## 2021-02-07 RX ORDER — NICOTINE POLACRILEX 4 MG
15 LOZENGE BUCCAL
Status: DISCONTINUED | OUTPATIENT
Start: 2021-02-07 | End: 2021-02-12 | Stop reason: HOSPADM

## 2021-02-07 RX ORDER — BISACODYL 5 MG/1
5 TABLET, DELAYED RELEASE ORAL DAILY PRN
Status: DISCONTINUED | OUTPATIENT
Start: 2021-02-07 | End: 2021-02-12 | Stop reason: HOSPADM

## 2021-02-07 RX ORDER — PANTOPRAZOLE SODIUM 40 MG/1
40 TABLET, DELAYED RELEASE ORAL EVERY MORNING
Status: DISCONTINUED | OUTPATIENT
Start: 2021-02-07 | End: 2021-02-12 | Stop reason: HOSPADM

## 2021-02-07 RX ORDER — CLOPIDOGREL BISULFATE 75 MG/1
75 TABLET ORAL NIGHTLY
Status: DISCONTINUED | OUTPATIENT
Start: 2021-02-07 | End: 2021-02-12 | Stop reason: HOSPADM

## 2021-02-07 RX ORDER — METOPROLOL SUCCINATE 100 MG/1
100 TABLET, EXTENDED RELEASE ORAL 2 TIMES DAILY
COMMUNITY

## 2021-02-07 RX ORDER — ACETAMINOPHEN 650 MG/1
650 SUPPOSITORY RECTAL EVERY 4 HOURS PRN
Status: DISCONTINUED | OUTPATIENT
Start: 2021-02-07 | End: 2021-02-12 | Stop reason: HOSPADM

## 2021-02-07 RX ORDER — INSULIN GLARGINE 100 [IU]/ML
24 INJECTION, SOLUTION SUBCUTANEOUS NIGHTLY
Status: DISCONTINUED | OUTPATIENT
Start: 2021-02-07 | End: 2021-02-08

## 2021-02-07 RX ORDER — BISACODYL 10 MG
10 SUPPOSITORY, RECTAL RECTAL DAILY PRN
Status: DISCONTINUED | OUTPATIENT
Start: 2021-02-07 | End: 2021-02-12 | Stop reason: HOSPADM

## 2021-02-07 RX ORDER — DOCUSATE SODIUM 100 MG/1
100 CAPSULE, LIQUID FILLED ORAL 2 TIMES DAILY
Status: DISCONTINUED | OUTPATIENT
Start: 2021-02-07 | End: 2021-02-12 | Stop reason: HOSPADM

## 2021-02-07 RX ORDER — MIRTAZAPINE 15 MG/1
7.5 TABLET, FILM COATED ORAL NIGHTLY
COMMUNITY

## 2021-02-07 RX ORDER — METOPROLOL SUCCINATE 100 MG/1
100 TABLET, EXTENDED RELEASE ORAL 2 TIMES DAILY
Status: DISCONTINUED | OUTPATIENT
Start: 2021-02-07 | End: 2021-02-12 | Stop reason: HOSPADM

## 2021-02-07 RX ORDER — QUETIAPINE FUMARATE 25 MG/1
25 TABLET, FILM COATED ORAL NIGHTLY
Status: DISCONTINUED | OUTPATIENT
Start: 2021-02-07 | End: 2021-02-12 | Stop reason: HOSPADM

## 2021-02-07 RX ORDER — ONDANSETRON 4 MG/1
4 TABLET, FILM COATED ORAL EVERY 6 HOURS PRN
Status: DISCONTINUED | OUTPATIENT
Start: 2021-02-07 | End: 2021-02-12 | Stop reason: HOSPADM

## 2021-02-07 RX ORDER — DOCUSATE SODIUM 100 MG/1
100 CAPSULE, LIQUID FILLED ORAL 2 TIMES DAILY
COMMUNITY

## 2021-02-07 RX ORDER — ALUMINA, MAGNESIA, AND SIMETHICONE 2400; 2400; 240 MG/30ML; MG/30ML; MG/30ML
15 SUSPENSION ORAL EVERY 6 HOURS PRN
Status: DISCONTINUED | OUTPATIENT
Start: 2021-02-07 | End: 2021-02-12 | Stop reason: HOSPADM

## 2021-02-07 RX ORDER — AMLODIPINE BESYLATE 5 MG/1
5 TABLET ORAL DAILY
COMMUNITY

## 2021-02-07 RX ORDER — ASPIRIN 81 MG/1
81 TABLET, CHEWABLE ORAL DAILY
COMMUNITY

## 2021-02-07 RX ORDER — INSULIN GLARGINE 100 [IU]/ML
24 INJECTION, SOLUTION SUBCUTANEOUS 2 TIMES DAILY
COMMUNITY

## 2021-02-07 RX ORDER — QUETIAPINE FUMARATE 25 MG/1
25 TABLET, FILM COATED ORAL NIGHTLY
COMMUNITY

## 2021-02-07 RX ORDER — CHOLECALCIFEROL (VITAMIN D3) 125 MCG
1000 CAPSULE ORAL DAILY
COMMUNITY

## 2021-02-07 RX ORDER — TAMSULOSIN HYDROCHLORIDE 0.4 MG/1
0.4 CAPSULE ORAL DAILY
Status: DISCONTINUED | OUTPATIENT
Start: 2021-02-07 | End: 2021-02-12 | Stop reason: HOSPADM

## 2021-02-07 RX ORDER — MIRTAZAPINE 15 MG/1
7.5 TABLET, FILM COATED ORAL NIGHTLY
Status: DISCONTINUED | OUTPATIENT
Start: 2021-02-07 | End: 2021-02-12 | Stop reason: HOSPADM

## 2021-02-07 RX ORDER — ACETAMINOPHEN 325 MG/1
650 TABLET ORAL EVERY 4 HOURS PRN
Status: DISCONTINUED | OUTPATIENT
Start: 2021-02-07 | End: 2021-02-12 | Stop reason: HOSPADM

## 2021-02-07 RX ORDER — HALOPERIDOL 5 MG/ML
5 INJECTION INTRAMUSCULAR EVERY 6 HOURS PRN
Status: DISCONTINUED | OUTPATIENT
Start: 2021-02-07 | End: 2021-02-12 | Stop reason: HOSPADM

## 2021-02-07 RX ORDER — ONDANSETRON 2 MG/ML
4 INJECTION INTRAMUSCULAR; INTRAVENOUS EVERY 6 HOURS PRN
Status: DISCONTINUED | OUTPATIENT
Start: 2021-02-07 | End: 2021-02-12 | Stop reason: HOSPADM

## 2021-02-07 RX ORDER — HYDRALAZINE HYDROCHLORIDE 25 MG/1
25 TABLET, FILM COATED ORAL ONCE
Status: COMPLETED | OUTPATIENT
Start: 2021-02-08 | End: 2021-02-08

## 2021-02-07 RX ORDER — DEXTROSE MONOHYDRATE 25 G/50ML
25 INJECTION, SOLUTION INTRAVENOUS
Status: DISCONTINUED | OUTPATIENT
Start: 2021-02-07 | End: 2021-02-12 | Stop reason: HOSPADM

## 2021-02-07 RX ORDER — ATORVASTATIN CALCIUM 20 MG/1
20 TABLET, FILM COATED ORAL NIGHTLY
Status: DISCONTINUED | OUTPATIENT
Start: 2021-02-07 | End: 2021-02-12 | Stop reason: HOSPADM

## 2021-02-07 RX ADMIN — QUETIAPINE FUMARATE 25 MG: 25 TABLET ORAL at 21:04

## 2021-02-07 RX ADMIN — ATORVASTATIN CALCIUM 20 MG: 20 TABLET, FILM COATED ORAL at 21:03

## 2021-02-07 RX ADMIN — DEXTROSE MONOHYDRATE 25 G: 500 INJECTION PARENTERAL at 08:14

## 2021-02-07 RX ADMIN — DOCUSATE SODIUM 100 MG: 100 CAPSULE, LIQUID FILLED ORAL at 21:03

## 2021-02-07 RX ADMIN — MIRTAZAPINE 7.5 MG: 15 TABLET, FILM COATED ORAL at 21:04

## 2021-02-07 RX ADMIN — DEXTROSE MONOHYDRATE 25 G: 500 INJECTION PARENTERAL at 11:43

## 2021-02-07 RX ADMIN — INSULIN LISPRO 2 UNITS: 100 INJECTION, SOLUTION INTRAVENOUS; SUBCUTANEOUS at 17:39

## 2021-02-07 RX ADMIN — INSULIN GLARGINE 24 UNITS: 100 INJECTION, SOLUTION SUBCUTANEOUS at 03:59

## 2021-02-07 RX ADMIN — METOPROLOL SUCCINATE 100 MG: 100 TABLET, EXTENDED RELEASE ORAL at 21:04

## 2021-02-07 RX ADMIN — CEFTRIAXONE SODIUM 1 G: 1 INJECTION, SOLUTION INTRAVENOUS at 21:06

## 2021-02-07 RX ADMIN — CLOPIDOGREL 75 MG: 75 TABLET, FILM COATED ORAL at 21:04

## 2021-02-07 NOTE — PLAN OF CARE
Goal Outcome Evaluation:  Plan of Care Reviewed With: daughter  Progress: improving  Outcome Summary: Pt's BS was low this morning, Pt extremely agitated, combative, refusing any care. Pt required dextrose d/t low BS. Soft wrist restraints placed so that Pt's BS could be stabilized. Pt was no longer combative and was very cooperative and pleasant when his BS increased. Wrist restraints removed at 0930 and were not placed on again. Pt becoming agitated again, blood sugar was checked and was again low. Pt was given additional dextrose with good results. Pt ate lunch, was agreeable to a bath and then napped. Pt awoke this evening, friendly and cooperative and BS was 158. Pt remains confused, oriented to self only. Pt ate 100% of lunch and dinner and received 2units of sliding scale insulin after finishing dinner as I wanted to be sure Pt ate. Pt currently resting calmly and comfortably. Pt's dtr Oksana was called and updated on Pt's day, she expressed gratitude.

## 2021-02-07 NOTE — NURSING NOTE
Patient's blood glucose 45 this morning per lab draw and 55 by glucometer. He was uncompliant with attempts to give him oral glucose or iv D50. Soft wrist restraints placed per MD order and IV D50 given. Will monitor his BG and orientation.

## 2021-02-07 NOTE — ED NOTES
Pt here because he was having aggressive behaviors to another resident at his NH.  Pt very combative and aggressive at times. Pt alert to person only. Thinks it's 1941 and he is in [place].  I wore full protective equipment throughout this patient encounter including a face mask, eye shield and gloves. Hand hygiene/washing of hands was performed before donning protective equipment and after removal when leaving the room.       Colette Odonnell RN  02/06/21 5351

## 2021-02-07 NOTE — H&P
Patient Name:  Jn Beauchamp  YOB: 1934  MRN:  [id number]  Admit Date:  2/6/2021  Patient Care Team:  Edil Gilliam MD as PCP - General (Family Medicine)      Subjective   History Present Illness     Chief Complaint   Patient presents with   • Altered Mental Status   • Aggressive Behavior     History of Present Illness   Mr. Beauchamp is a 86 y.o. former smoker with a history of insulin-dependent type 2 diabetes, PVD, parastomal atrial fibrillation, HLD, CVA, GERD, dementia, and COPD that presents to [facility] due to aggressive behavior and altered mental status.  Due to patient mentation, HPI has been taken from ER documentation.  Patient became agitated tonight with the nursing home staff.  He actually took a swing at one of the nurses.  Patient also was combative and uncooperative with the ER nursing staff.  Patient has done this previously with acute UTIs.  UA obtained in the ED department is consistent with a UTI.  Patient was kept for the evaluation.    Review of Systems   Unable to perform ROS: Dementia        Personal History     Past Medical History:   Diagnosis Date   • Acute cystitis with hematuria    • Adult failure to thrive    • BPH (benign prostatic hyperplasia)    • Cancer (CMS/HCC)     LUNG    • Chronic kidney disease, stage 1    • Chronic obstructive pulmonary disease, unspecified (CMS/HCC)    • Cognitive communication deficit    • Diabetes mellitus (CMS/HCC)    • Dysphagia, oropharyngeal phase    • Elevated blood pressure reading without diagnosis of hypertension    • GERD (gastroesophageal reflux disease)    • Hemiplegia, unspecified affecting right nondominant side (CMS/HCC)    • Hyperlipidemia, unspecified    • Muscle weakness (generalized)    • Other abnormalities of gait and mobility    • Other specified abnormal findings of blood chemistry    • Paroxysmal atrial fibrillation (CMS/HCC)    • Peripheral vascular disease, unspecified (CMS/HCC)    •  Personal history of transient ischemic attack (TIA), and cerebral infarction without residual deficits    • Repeated falls    • Stroke (CMS/HCC)     RIGHT SIDE DEFICIT    • Type 2 diabetes mellitus with hypercholesterolemia (CMS/HCC)    • Unspecified abnormalities of gait and mobility    • Unspecified dementia with behavioral disturbance (CMS/HCC)    • Unspecified injury of head, subsequent encounter    • Urinary tract infection, site not specified      Past Surgical History:   Procedure Laterality Date   • ADENOIDECTOMY     • APPENDECTOMY     • CARDIAC CATHETERIZATION     • COLONOSCOPY     • CORONARY STENT PLACEMENT     • ENDOSCOPY     • EYE SURGERY     • LUNG LOBECTOMY Right     RIGHT UPPER LOBE     History reviewed. No pertinent family history.  Social History     Tobacco Use   • Smoking status: Former Smoker   • Smokeless tobacco: Never Used   Substance Use Topics   • Alcohol use: No     Frequency: Never   • Drug use: No     No current facility-administered medications on file prior to encounter.      Current Outpatient Medications on File Prior to Encounter   Medication Sig Dispense Refill   • amLODIPine (NORVASC) 5 MG tablet Take 5 mg by mouth Daily.     • aspirin 81 MG chewable tablet Chew 81 mg Daily.     • atorvastatin (LIPITOR) 20 MG tablet Take 20 mg by mouth Every Night.     • clopidogrel (PLAVIX) 75 MG tablet Take 75 mg by mouth Every Night.     • cyproheptadine 2 MG/5ML syrup Take 2 mg by mouth Every 12 (Twelve) Hours.     • docusate sodium (COLACE) 100 MG capsule Take 100 mg by mouth 2 (Two) Times a Day.     • insulin aspart (novoLOG FLEXPEN) 100 UNIT/ML solution pen-injector sc pen Inject 4 Units under the skin into the appropriate area as directed 3 (Three) Times a Day With Meals.     • Insulin Glargine (Lantus SoloStar) 100 UNIT/ML injection pen Inject 24 Units under the skin into the appropriate area as directed 2 (Two) Times a Day.     • metoprolol succinate XL (TOPROL-XL) 100 MG 24 hr tablet  Take 100 mg by mouth 2 (two) times a day.     • mirtazapine (REMERON) 15 MG tablet Take 7.5 mg by mouth Every Night.     • omeprazole (priLOSEC) 40 MG capsule Take 40 mg by mouth Daily.     • QUEtiapine (SEROquel) 25 MG tablet Take 25 mg by mouth Every Night.     • tamsulosin (FLOMAX) 0.4 MG capsule 24 hr capsule Take 1 capsule by mouth Daily.     • vitamin B-12 (CYANOCOBALAMIN) 500 MCG tablet Take 1,000 mcg by mouth Daily.       No Known Allergies    Objective    Objective     Vital Signs  Temp:  [98 °F (36.7 °C)-98.1 °F (36.7 °C)] 98 °F (36.7 °C)  Heart Rate:  [89-91] 91  Resp:  [16-18] 16  BP: (184-210)/(70-84) 184/70  SpO2:  [100 %] 100 %  on  Flow (L/min):  [4] 4;   Device (Oxygen Therapy): room air  Body mass index is 27.1 kg/m².    Physical Exam  Vitals signs and nursing note reviewed.   Constitutional:       General: He is sleeping. He is not in acute distress.     Appearance: He is well-developed. He is not toxic-appearing.      Comments: Chronically ill-appearing, sleeping but easily arousable   HENT:      Head: Normocephalic and atraumatic.   Eyes:      General: No scleral icterus.        Right eye: No discharge.         Left eye: No discharge.      Conjunctiva/sclera: Conjunctivae normal.   Neck:      Musculoskeletal: Normal range of motion and neck supple.      Vascular: No JVD.   Cardiovascular:      Rate and Rhythm: Normal rate and regular rhythm.      Heart sounds: Normal heart sounds. No murmur. No friction rub. No gallop.    Pulmonary:      Effort: Pulmonary effort is normal. No respiratory distress.      Breath sounds: Normal breath sounds. No wheezing or rales.   Abdominal:      General: Bowel sounds are normal. There is no distension.      Palpations: Abdomen is soft.      Tenderness: There is no abdominal tenderness. There is no guarding.   Musculoskeletal: Normal range of motion.         General: No tenderness or deformity.   Skin:     General: Skin is warm and dry.      Capillary Refill:  Capillary refill takes less than 2 seconds.   Neurological:      Mental Status: He is easily aroused. He is disoriented.   Psychiatric:         Behavior: Behavior normal. Behavior is cooperative.         Cognition and Memory: Cognition is impaired.      Comments: Patient very quiet on assessment       Results Review:  I reviewed the patient's new clinical results.  Discussed with ED provider.    Lab Results (last 24 hours)     Procedure Component Value Units Date/Time    Urinalysis With Microscopic If Indicated (No Culture) - Urine, Catheter In/Out [[id number]]  (Abnormal) Collected: 02/06/21 2133    Specimen: Urine, Catheter In/Out Updated: 02/06/21 2206     Color, UA Yellow     Appearance, UA Cloudy     pH, UA <=5.0     Specific Gravity, UA 1.021     Glucose, UA >=1000 mg/dL (3+)     Ketones, UA Negative     Bilirubin, UA Negative     Blood, UA Moderate (2+)     Protein, UA >=300 mg/dL (3+)     Leuk Esterase, UA Small (1+)     Nitrite, UA Negative     Urobilinogen, UA 0.2 E.U./dL    Urine Drug Screen - Urine, Catheter In/Out [[id number]]  (Normal) Collected: 02/06/21 2133    Specimen: Urine, Catheter In/Out Updated: 02/06/21 2206     Amphet/Methamphet, Screen Negative     Barbiturates Screen, Urine Negative     Benzodiazepine Screen, Urine Negative     Cocaine Screen, Urine Negative     Opiate Screen Negative     THC, Screen, Urine Negative     Methadone Screen, Urine Negative     Oxycodone Screen, Urine Negative    Narrative:      Negative Thresholds For Drugs Screened:     Amphetamines               500 ng/ml   Barbiturates               200 ng/ml   Benzodiazepines            100 ng/ml   Cocaine                    300 ng/ml   Methadone                  300 ng/ml   Opiates                    300 ng/ml   Oxycodone                  100 ng/ml   THC                        50 ng/ml    The Normal Value for all drugs tested is negative. This report includes final unconfirmed screening results to be used for medical  treatment purposes only. Unconfirmed results must not be used for non-medical purposes such as employment or legal testing. Clinical consideration should be applied to any drug of abuse test, particulary when unconfirmed results are used.    Urinalysis, Microscopic Only - Urine, Catheter In/Out [[id number]]  (Abnormal) Collected: 02/06/21 2133    Specimen: Urine, Catheter In/Out Updated: 02/06/21 2218     RBC, UA 0-2 /HPF      WBC, UA Too Numerous to Count /HPF      Bacteria, UA 2+ /HPF      Squamous Epithelial Cells, UA None Seen /HPF      Yeast, UA Small/1+ Yeast /HPF      Hyaline Casts, UA None Seen /LPF      Methodology Manual Light Microscopy    CBC & Differential [[id number]]  (Abnormal) Collected: 02/06/21 2143    Specimen: Blood Updated: 02/06/21 2156    Narrative:      The following orders were created for panel order CBC & Differential.  Procedure                               Abnormality         Status                     ---------                               -----------         ------                     CBC Auto Differential[[id number]]        Abnormal            Final result                 Please view results for these tests on the individual orders.    Comprehensive Metabolic Panel [[id number]]  (Abnormal) Collected: 02/06/21 2143    Specimen: Blood Updated: 02/06/21 2212     Glucose 224 mg/dL      BUN 35 mg/dL      Creatinine 2.05 mg/dL      Sodium 144 mmol/L      Potassium 4.5 mmol/L      Chloride 109 mmol/L      CO2 24.2 mmol/L      Calcium 9.5 mg/dL      Total Protein 6.4 g/dL      Albumin 3.70 g/dL      ALT (SGPT) 6 U/L      AST (SGOT) 12 U/L      Alkaline Phosphatase 108 U/L      Total Bilirubin <0.2 mg/dL      eGFR Non African Amer 31 mL/min/1.73      Globulin 2.7 gm/dL      A/G Ratio 1.4 g/dL      BUN/Creatinine Ratio 17.1     Anion Gap 10.8 mmol/L     Narrative:      GFR Normal >60  Chronic Kidney Disease <60  Kidney Failure <15      CBC Auto Differential [[id number]]  (Abnormal) Collected:  02/06/21 2143    Specimen: Blood Updated: 02/06/21 2156     WBC 9.20 10*3/mm3      RBC 4.12 10*6/mm3      Hemoglobin 11.1 g/dL      Hematocrit 35.4 %      MCV 85.9 fL      MCH 26.9 pg      MCHC 31.4 g/dL      RDW 14.7 %      RDW-SD 46.2 fl      MPV 10.6 fL      Platelets 234 10*3/mm3      Neutrophil % 71.9 %      Lymphocyte % 19.5 %      Monocyte % 5.3 %      Eosinophil % 2.1 %      Basophil % 0.8 %      Immature Grans % 0.4 %      Neutrophils, Absolute 6.62 10*3/mm3      Lymphocytes, Absolute 1.79 10*3/mm3      Monocytes, Absolute 0.49 10*3/mm3      Eosinophils, Absolute 0.19 10*3/mm3      Basophils, Absolute 0.07 10*3/mm3      Immature Grans, Absolute 0.04 10*3/mm3      nRBC 0.0 /100 WBC     Ethanol [[id number]] Collected: 02/06/21 2143    Specimen: Blood Updated: 02/06/21 2212     Ethanol <10 mg/dL      Ethanol % <0.010 %     COVID PRE-OP / PRE-PROCEDURE SCREENING ORDER (NO ISOLATION) - Swab, Nasopharynx [[id number]] Collected: 02/06/21 2323    Specimen: Swab from Nasopharynx Updated: 02/06/21 2328    Narrative:      The following orders were created for panel order COVID PRE-OP / PRE-PROCEDURE SCREENING ORDER (NO ISOLATION) - Swab, Nasopharynx.  Procedure                               Abnormality         Status                     ---------                               -----------         ------                     COVID-19,APTIMA PANTHER,...[[id number]]                      In process                   Please view results for these tests on the individual orders.    COVID-19,APTIMA PANTHER,[facility] IN-HOUSE, NP/OP SWAB IN UTM/VTM/SALINE TRANSPORT MEDIA,24 HR TAT - Swab, Nasopharynx [[id number]] Collected: 02/06/21 2323    Specimen: Swab from Nasopharynx Updated: 02/06/21 2328          Imaging Results (Last 24 Hours)     ** No results found for the last 24 hours. **               No orders to display        Assessment/Plan     Active Hospital Problems    Diagnosis  POA   • **Dementia with behavioral disturbance  (CMS/[facility]) [F03.91]  Yes   • Type 2 diabetes mellitus with hyperglycemia, without long-term current use of insulin (CMS/[facility]) [E11.65]  Yes   • History of CVA (cerebrovascular accident) [Z86.73]  Not Applicable   • PVD (peripheral vascular disease) (CMS/[facility]) [I73.9]  Yes   • Paroxysmal atrial fibrillation (CMS/[facility]) [I48.0]  Yes   • GERD (gastroesophageal reflux disease) [K21.9]  Yes   • HLD (hyperlipidemia) [E78.5]  Yes   • COPD (chronic obstructive pulmonary disease) (CMS/[facility]) [J44.9]  Yes      Resolved Hospital Problems   No resolved problems to display.       Mr. Beauchamp is a 86 y.o. former smoker who presents with dementia with behavior disturbances any urinary tract infection.    Dementia with behavioral disturbance/acute UTI  -Patient has been aggressive with the nursing home staff as well as the ER staff. Patient's UA is consistent with UTI which may be the culprit of his behavior disturbance.  Will continue Rocephin.  Urine cultures pending, will adjust antibiotics accordingly.  -Psych consultation  -Continue his home dose of Seroquel  -Continue to monitor    Type 2 diabetes mellitus  -Accu-Cheks 4 times a day with meals and at bedtime  -Moderate dose correctional factor with hypoglycemic protocol  -Check hemoglobin A1c  -Hold oral diabetic medication  -Resume basal insulin    Paroxysmal atrial fibrillation  -Rate control, resume Toprol-XL.  No oral anticoagulant noted on MAR    COPD  -No signs or symptoms of exacerbation on exam    Hyperlipidemia  -Continue statin therapy    GERD  -Continue PPI      I discussed the patient's findings and my recommendations with patient and ED provider.    VTE Prophylaxis - SCDs.  Code Status - Full code.       ALBERTINA Billings  [facility]  02/07/21  02:00 EST

## 2021-02-07 NOTE — ED NOTES
"Nursing report ED to floor  Jn Beauchamp  86 y.o.  male    HPI (triage note):   Chief Complaint   Patient presents with   • Altered Mental Status   • Aggressive Behavior       Admitting doctor:   Davian Rubio MD    Admitting diagnosis:   The primary encounter diagnosis was Dementia with behavioral disturbance, unspecified dementia type (CMS/HCC). A diagnosis of Acute UTI was also pertinent to this visit.    Code status:   Current Code Status     Date Active Code Status Order ID Comments User Context       Not on file    Advance Care Planning Activity          Allergies:   Patient has no known allergies.    Weight:       02/06/21 2324   Weight: 78.5 kg (173 lb)       Most recent vitals:   Vitals:    02/06/21 2047 02/06/21 2131 02/06/21 2324   BP: (!) 210/84 (!) 185/83    Pulse: 89     Resp: 18     Temp: 98.1 °F (36.7 °C)     SpO2: 100%     Weight:   78.5 kg (173 lb)   Height:   170.2 cm (67\")       Active LDAs/IV Access:   Lines, Drains & Airways    Active LDAs     Name:   Placement date:   Placement time:   Site:   Days:    Peripheral IV 02/06/21 2245 Right Antecubital   02/06/21 2245    Antecubital   less than 1                Labs (abnormal labs have a star):   Labs Reviewed   COMPREHENSIVE METABOLIC PANEL - Abnormal; Notable for the following components:       Result Value    Glucose 224 (*)     BUN 35 (*)     Creatinine 2.05 (*)     Chloride 109 (*)     eGFR Non  Amer 31 (*)     All other components within normal limits    Narrative:     GFR Normal >60  Chronic Kidney Disease <60  Kidney Failure <15     URINALYSIS W/ MICROSCOPIC IF INDICATED (NO CULTURE) - Abnormal; Notable for the following components:    Appearance, UA Cloudy (*)     Glucose, UA >=1000 mg/dL (3+) (*)     Blood, UA Moderate (2+) (*)     Protein, UA >=300 mg/dL (3+) (*)     Leuk Esterase, UA Small (1+) (*)     All other components within normal limits   CBC WITH AUTO DIFFERENTIAL - Abnormal; Notable for the following components:    RBC " 4.12 (*)     Hemoglobin 11.1 (*)     Hematocrit 35.4 (*)     MCHC 31.4 (*)     Lymphocyte % 19.5 (*)     All other components within normal limits   URINALYSIS, MICROSCOPIC ONLY - Abnormal; Notable for the following components:    WBC, UA Too Numerous to Count (*)     Bacteria, UA 2+ (*)     All other components within normal limits   URINE DRUG SCREEN - Normal    Narrative:     Negative Thresholds For Drugs Screened:     Amphetamines               500 ng/ml   Barbiturates               200 ng/ml   Benzodiazepines            100 ng/ml   Cocaine                    300 ng/ml   Methadone                  300 ng/ml   Opiates                    300 ng/ml   Oxycodone                  100 ng/ml   THC                        50 ng/ml    The Normal Value for all drugs tested is negative. This report includes final unconfirmed screening results to be used for medical treatment purposes only. Unconfirmed results must not be used for non-medical purposes such as employment or legal testing. Clinical consideration should be applied to any drug of abuse test, particulary when unconfirmed results are used.   COVID PRE-OP / PRE-PROCEDURE SCREENING ORDER (NO ISOLATION)    Narrative:     The following orders were created for panel order COVID PRE-OP / PRE-PROCEDURE SCREENING ORDER (NO ISOLATION) - Swab, Nasopharynx.  Procedure                               Abnormality         Status                     ---------                               -----------         ------                     COVID-19,APTIMA PANTHER,...[[id number]]                      In process                   Please view results for these tests on the individual orders.   COVID-19,APTIMA PANTHER,[facility] IN-HOUSE,NP/OP SWAB IN UTM/VTM/SALINE TRANSPORT MEDIA,24 HR TAT   ETHANOL   URINALYSIS W/ CULTURE IF INDICATED   CBC AND DIFFERENTIAL    Narrative:     The following orders were created for panel order CBC & Differential.  Procedure                               Abnormality          Status                     ---------                               -----------         ------                     CBC Auto Differential[[id number]]        Abnormal            Final result                 Please view results for these tests on the individual orders.       EKG:   No orders to display       Meds given in ED:   Medications   sodium chloride 0.9 % flush 10 mL (has no administration in time range)   cefTRIAXone (ROCEPHIN) IVPB 1 g (1 g Intravenous New Bag 2/6/21 6528)       Imaging results:  No radiology results for the last day    Ambulatory status:   - bedrest    Social issues:   Social History     Socioeconomic History   • Marital status: Single     Spouse name: Not on file   • Number of children: Not on file   • Years of education: Not on file   • Highest education level: Not on file   Tobacco Use   • Smoking status: Former Smoker   • Smokeless tobacco: Never Used   Substance and Sexual Activity   • Alcohol use: No     Frequency: Never   • Drug use: No   • Sexual activity: Not Currently    Nursing report ED to floor     Colette Odonnell RN  02/06/21 2468

## 2021-02-07 NOTE — CONSULTS
IDENTIFYING INFORMATION: The patient is an 86-year-old white male admitted with behavioral changes and increasing agitation possibly related to urinary tract infection    CHIEF COMPLAINT: None given     INFORMant: Chart, patient does not awaken for interview    RELIABILITY: Good    HISTORY OF PRESENT ILLNESS: The patient is an 86-year-old white male admitted after he had become aggressive with staff at his nursing facility.  according to the chart the patient has a history of dementia.  He took a swing at her nurse.  The patient has a history of similar agitation related to previous urinary tract infections.  On admission the patient's urine did look consistent with the area tract infection though culture is pending.  When seen today the patient is sleeping soundly does not awaken for interview.  He is currently prescribed psychiatric medications include Seroquel and Remeron.    PAST PSYCHIATRIC HISTORY: As above    PAST MEDICAL HISTORY: Significant for urinary tract infection, benign prostatic hypertrophy, chronic renal disease, chronic obstructive pulmonary disease, diabetes mellitus, dysphagia,, hypertension, GERD, hemiplegia, hyperlipidemia, paroxysmal atrial fibrillation, for peripheral vascular disease, history of TIAs,    MEDICATIONS:   Current Facility-Administered Medications   Medication Dose Route Frequency Provider Last Rate Last Admin   • acetaminophen (TYLENOL) tablet 650 mg  650 mg Oral Q4H PRN Bree Parr APRN        Or   • acetaminophen (TYLENOL) 160 MG/5ML solution 650 mg  650 mg Oral Q4H PRN Bree Parr APRN        Or   • acetaminophen (TYLENOL) suppository 650 mg  650 mg Rectal Q4H PRN Bree Parr APRN       • aluminum-magnesium hydroxide-simethicone (MAALOX MAX) 400-400-40 MG/5ML suspension 15 mL  15 mL Oral Q6H PRN Bree Parr APRN       • amLODIPine (NORVASC) tablet 5 mg  5 mg Oral Daily Bree Parr APRN       • aspirin chewable tablet 81 mg  81 mg  Oral Daily Bree Parr, ALBERTINA       • atorvastatin (LIPITOR) tablet 20 mg  20 mg Oral Nightly Bree Parr, APRLILIANA       • bisacodyl (DULCOLAX) EC tablet 5 mg  5 mg Oral Daily PRN Bree Parr, APRLILIANA       • bisacodyl (DULCOLAX) suppository 10 mg  10 mg Rectal Daily PRN Bree Parr, APRLILIANA       • cefTRIAXone (ROCEPHIN) IVPB 1 g  1 g Intravenous Q24H Bree Parr, ALBERTINA       • clopidogrel (PLAVIX) tablet 75 mg  75 mg Oral Nightly Bree Parr, APRN       • dextrose (D50W) 25 g/ 50mL Intravenous Solution 25 g  25 g Intravenous Q15 Min PRN Bree Parr APRN   25 g at 02/07/21 1143   • dextrose (GLUTOSE) oral gel 15 g  15 g Oral Q15 Min PRN Bree Parr, ALBERTINA       • docusate sodium (COLACE) capsule 100 mg  100 mg Oral BID Bree Parr, APRLILIANA       • glucagon (human recombinant) (GLUCAGEN DIAGNOSTIC) injection 1 mg  1 mg Subcutaneous Q15 Min PRN Bree Parr APRN       • insulin glargine (LANTUS, SEMGLEE) injection 24 Units  24 Units Subcutaneous Nightly rBee Parr APRN   24 Units at 02/07/21 0359   • insulin lispro (humaLOG, ADMELOG) injection 0-9 Units  0-9 Units Subcutaneous TID AC Bree Parr APRN       • metoprolol succinate XL (TOPROL-XL) 24 hr tablet 100 mg  100 mg Oral BID Bree Parr, ALBERTINA       • mirtazapine (REMERON) tablet 7.5 mg  7.5 mg Oral Nightly Bree Parr, ALBERTINA       • ondansetron (ZOFRAN) tablet 4 mg  4 mg Oral Q6H PRN Bree Parr, ALBERTINA        Or   • ondansetron (ZOFRAN) injection 4 mg  4 mg Intravenous Q6H PRN Bree Parr, APRLILIANA       • pantoprazole (PROTONIX) EC tablet 40 mg  40 mg Oral QAM Bree Parr, APRN       • QUEtiapine (SEROquel) tablet 25 mg  25 mg Oral Nightly Bree Parr, APRN       • sodium chloride 0.9 % flush 10 mL  10 mL Intravenous PRN Garrett Christie MD       • sodium chloride 0.9 % flush 10 mL  10 mL Intravenous PRN Bree Parr, APRN        • tamsulosin (FLOMAX) 24 hr capsule 0.4 mg  0.4 mg Oral Daily Bree Parr, APRN             ALLERGIES: None    FAMILY HISTORY: Noncontributory    SOCIAL HISTORY: The patient resides at a local nursing facility.  There is no reported use of alcohol tobacco or street drugs.    MENTAL STATUS EXAM: The patient is an elderly white male who is in soft wrist restraints.  He is sleeping soundly and does not awaken for interview.    ASSETS/LIABILITIES: To be assessed    DIAGNOSTIC IMPRESSION: Primary dementia Alzheimer's type with behavioral disturbance, superimposed delirium secondary to urinary tract infection, multiple medical problems described previously    PLAN: Given his history of previous episodes of agitation related to urinary tract infections, it is reasonable to believe that his current increased level of agitation is secondary to this infectious process.  I would recommend continuation of Seroquel and Remeron and will add as needed haloperidol for agitation.  The patient's mentation should return to baseline with resolution of his urinary tract infection.  I will continue to follow the patient with you.
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
## 2021-02-07 NOTE — PROGRESS NOTES
Attempted to call lab to ensure a urine culture was added but unfortunately there was no answer.  Spoke with nursing who will follow up to ensure that the urine culture is added to the already collected specimen.
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
## 2021-02-08 LAB
ANION GAP SERPL CALCULATED.3IONS-SCNC: 8.6 MMOL/L (ref 5–15)
BACTERIA SPEC AEROBE CULT: ABNORMAL
BASOPHILS # BLD AUTO: 0.07 10*3/MM3 (ref 0–0.2)
BASOPHILS NFR BLD AUTO: 1 % (ref 0–1.5)
BUN SERPL-MCNC: 28 MG/DL (ref 8–23)
BUN/CREAT SERPL: 16.9 (ref 7–25)
CALCIUM SPEC-SCNC: 9.2 MG/DL (ref 8.6–10.5)
CHLORIDE SERPL-SCNC: 109 MMOL/L (ref 98–107)
CO2 SERPL-SCNC: 24.4 MMOL/L (ref 22–29)
CREAT SERPL-MCNC: 1.66 MG/DL (ref 0.76–1.27)
DEPRECATED RDW RBC AUTO: 43.9 FL (ref 37–54)
EOSINOPHIL # BLD AUTO: 0.41 10*3/MM3 (ref 0–0.4)
EOSINOPHIL NFR BLD AUTO: 5.7 % (ref 0.3–6.2)
ERYTHROCYTE [DISTWIDTH] IN BLOOD BY AUTOMATED COUNT: 14.4 % (ref 12.3–15.4)
GFR SERPL CREATININE-BSD FRML MDRD: 39 ML/MIN/1.73
GLUCOSE BLDC GLUCOMTR-MCNC: 185 MG/DL (ref 70–130)
GLUCOSE BLDC GLUCOMTR-MCNC: 294 MG/DL (ref 70–130)
GLUCOSE BLDC GLUCOMTR-MCNC: 301 MG/DL (ref 70–130)
GLUCOSE BLDC GLUCOMTR-MCNC: 97 MG/DL (ref 70–130)
GLUCOSE SERPL-MCNC: 96 MG/DL (ref 65–99)
HCT VFR BLD AUTO: 36.1 % (ref 37.5–51)
HGB BLD-MCNC: 11.4 G/DL (ref 13–17.7)
IMM GRANULOCYTES # BLD AUTO: 0.03 10*3/MM3 (ref 0–0.05)
IMM GRANULOCYTES NFR BLD AUTO: 0.4 % (ref 0–0.5)
LYMPHOCYTES # BLD AUTO: 2.07 10*3/MM3 (ref 0.7–3.1)
LYMPHOCYTES NFR BLD AUTO: 28.6 % (ref 19.6–45.3)
MCH RBC QN AUTO: 26.6 PG (ref 26.6–33)
MCHC RBC AUTO-ENTMCNC: 31.6 G/DL (ref 31.5–35.7)
MCV RBC AUTO: 84.3 FL (ref 79–97)
MONOCYTES # BLD AUTO: 0.46 10*3/MM3 (ref 0.1–0.9)
MONOCYTES NFR BLD AUTO: 6.3 % (ref 5–12)
NEUTROPHILS NFR BLD AUTO: 4.21 10*3/MM3 (ref 1.7–7)
NEUTROPHILS NFR BLD AUTO: 58 % (ref 42.7–76)
NRBC BLD AUTO-RTO: 0 /100 WBC (ref 0–0.2)
PLATELET # BLD AUTO: 240 10*3/MM3 (ref 140–450)
PMV BLD AUTO: 11.1 FL (ref 6–12)
POTASSIUM SERPL-SCNC: 3.3 MMOL/L (ref 3.5–5.2)
RBC # BLD AUTO: 4.28 10*6/MM3 (ref 4.14–5.8)
SODIUM SERPL-SCNC: 142 MMOL/L (ref 136–145)
WBC # BLD AUTO: 7.25 10*3/MM3 (ref 3.4–10.8)

## 2021-02-08 PROCEDURE — 85025 COMPLETE CBC W/AUTO DIFF WBC: CPT | Performed by: INTERNAL MEDICINE

## 2021-02-08 PROCEDURE — 80048 BASIC METABOLIC PNL TOTAL CA: CPT | Performed by: INTERNAL MEDICINE

## 2021-02-08 PROCEDURE — 63710000001 INSULIN LISPRO (HUMAN) PER 5 UNITS: Performed by: NURSE PRACTITIONER

## 2021-02-08 PROCEDURE — 25010000002 CEFTRIAXONE PER 250 MG: Performed by: NURSE PRACTITIONER

## 2021-02-08 PROCEDURE — 82962 GLUCOSE BLOOD TEST: CPT

## 2021-02-08 RX ADMIN — METOPROLOL SUCCINATE 100 MG: 100 TABLET, EXTENDED RELEASE ORAL at 08:23

## 2021-02-08 RX ADMIN — CEFTRIAXONE SODIUM 1 G: 1 INJECTION, SOLUTION INTRAVENOUS at 20:17

## 2021-02-08 RX ADMIN — TAMSULOSIN HYDROCHLORIDE 0.4 MG: 0.4 CAPSULE ORAL at 08:23

## 2021-02-08 RX ADMIN — DOCUSATE SODIUM 100 MG: 100 CAPSULE, LIQUID FILLED ORAL at 08:23

## 2021-02-08 RX ADMIN — MIRTAZAPINE 7.5 MG: 15 TABLET, FILM COATED ORAL at 20:17

## 2021-02-08 RX ADMIN — QUETIAPINE FUMARATE 25 MG: 25 TABLET ORAL at 20:17

## 2021-02-08 RX ADMIN — ASPIRIN 81 MG: 81 TABLET, CHEWABLE ORAL at 08:23

## 2021-02-08 RX ADMIN — PANTOPRAZOLE SODIUM 40 MG: 40 TABLET, DELAYED RELEASE ORAL at 06:43

## 2021-02-08 RX ADMIN — CLOPIDOGREL 75 MG: 75 TABLET, FILM COATED ORAL at 20:17

## 2021-02-08 RX ADMIN — METOPROLOL SUCCINATE 100 MG: 100 TABLET, EXTENDED RELEASE ORAL at 20:17

## 2021-02-08 RX ADMIN — HYDRALAZINE HYDROCHLORIDE 25 MG: 25 TABLET, FILM COATED ORAL at 00:01

## 2021-02-08 RX ADMIN — ATORVASTATIN CALCIUM 20 MG: 20 TABLET, FILM COATED ORAL at 20:18

## 2021-02-08 RX ADMIN — INSULIN LISPRO 6 UNITS: 100 INJECTION, SOLUTION INTRAVENOUS; SUBCUTANEOUS at 11:32

## 2021-02-08 RX ADMIN — DOCUSATE SODIUM 100 MG: 100 CAPSULE, LIQUID FILLED ORAL at 20:19

## 2021-02-08 RX ADMIN — AMLODIPINE BESYLATE 5 MG: 5 TABLET ORAL at 08:23

## 2021-02-08 NOTE — CONSULTS
The patient is resting calmly today and does not awaken for interview.  The chart does not indicate any further episodes of aggressive behavior stating that the patient has been cooperative with care once his blood sugar had normalized.  I would expect his mentation to return to baseline with resolution of his urinary tract infection and stabilization of his blood glucose.

## 2021-02-08 NOTE — NURSING NOTE
"Nurse page [facility] re: pt's BP sustaining in the 177/80. PO hydrolazine 25 mg once given at 0001 AM, pt's BP still elevated. Awaiting call back.    [id number] Nurse spoke with Marilyn Lora which stated \"to continue to monitor pt's BP and call back if SBP greater than 180\".        "

## 2021-02-08 NOTE — PROGRESS NOTES
"Physicians Statement of Medical Necessity for  Ambulance Transportation    GENERAL INFORMATION     Name: Jn Beauchamp  YOB: 1934  Medicare #: [id number]  Transport Date:   Origin: [facility]   Destination: [facility]   Is the Patient's stay covered under Medicare Part A (PPS/DRG?)Yes  Closest appropriate facility? Yes  If this a hosp-hosp transfer? No  Is this a hospice patient? No    MEDICAL NECESSITY QUESTIONAIRE    Ambulance Transportation is medically necessary only if other means of transportation are contraindicated or would be potentially harmful to the patient.  To meet this requirement, the patient must be either \"bed confined\" or suffer from a condition such that transport by means other than an ambulance is contraindicated by the patient's condition.  The following questions must be answered by the healthcare professional signing below for this form to be valid:     1) Describe the MEDICAL CONDITION (physical and/or mental) of this patient AT THE TIME OF AMBULANCE TRANSPORT that requires the patient to be transported in an ambulance, and why transport by other means is contraindicated by the patient's condition:   Patient Active Problem List    Diagnosis   • *Dementia with behavioral disturbance (CMS/[facility]) [F03.91]   • Type 2 diabetes mellitus with hyperglycemia, without long-term current use of insulin (CMS/[facility]) [E11.65]   • History of CVA (cerebrovascular accident) [Z86.73]   • PVD (peripheral vascular disease) (CMS/[facility]) [I73.9]   • Paroxysmal atrial fibrillation (CMS/[facility]) [I48.0]   • GERD (gastroesophageal reflux disease) [K21.9]   • HLD (hyperlipidemia) [E78.5]   • COPD (chronic obstructive pulmonary disease) (CMS/[facility]) [J44.9]       Past Medical History:   Diagnosis Date   • Acute cystitis with hematuria    • Adult failure to thrive    • BPH (benign prostatic hyperplasia)    • Cancer (CMS/HCC)     LUNG    • Chronic kidney disease, stage 1    • Chronic obstructive " "pulmonary disease, unspecified (CMS/[facility])    • Cognitive communication deficit    • Diabetes mellitus (CMS/[facility])    • Dysphagia, oropharyngeal phase    • Elevated blood pressure reading without diagnosis of hypertension    • GERD (gastroesophageal reflux disease)    • Hemiplegia, unspecified affecting right nondominant side (CMS/HCC)    • Hyperlipidemia, unspecified    • Muscle weakness (generalized)    • Other abnormalities of gait and mobility    • Other specified abnormal findings of blood chemistry    • Paroxysmal atrial fibrillation (CMS/HCC)    • Peripheral vascular disease, unspecified (CMS/[facility])    • Personal history of transient ischemic attack (TIA), and cerebral infarction without residual deficits    • Repeated falls    • Stroke (CMS/[facility])     RIGHT SIDE DEFICIT    • Type 2 diabetes mellitus with hypercholesterolemia (CMS/[facility])    • Unspecified abnormalities of gait and mobility    • Unspecified dementia with behavioral disturbance (CMS/[facility])    • Unspecified injury of head, subsequent encounter    • Urinary tract infection, site not specified       Past Surgical History:   Procedure Laterality Date   • ADENOIDECTOMY     • APPENDECTOMY     • CARDIAC CATHETERIZATION     • COLONOSCOPY     • CORONARY STENT PLACEMENT     • ENDOSCOPY     • EYE SURGERY     • LUNG LOBECTOMY Right     RIGHT UPPER LOBE      2) Is this patient \"bed confined\" as defined below?Yes   To be \"bed confined\" the patient must satisfy all three of the following criteria:  (1) unable to get up from bed without assistance; AND (2) unable to ambulate;  AND (3) unable to sit in a chair or wheelchair.  3) Can this patient safely be transported by car or wheelchair van (I.e., may safely sit during transport, without an attendant or monitoring?)No   4. In addition to completing questions 1-3 above, please check any of the following conditions that apply*:          *Note: supporting documentation for any boxes checked must be maintained in the patient's " medical records Patient is confused      SIGNATURE OF PHYSICIAN OR OTHER AUTHORIZED HEALTHCARE PROFESSIONAL    I certify that the above information is true and correct based on my evaluation of this patient, and represent that the patient requires transport by ambulance and that other forms of transport are contraindicated.  I understand that this information will be used by the Centers for Medicare and Medicaid Services (CMS) to support the determiniation of medical necessity for ambulance services, and I represent that I have personal knowledge of the patient's condition at the time of transport.       If this box is checked, I also certify that the patient is physically or mentally incapable of signing the ambulance service's claim form and that the institution with which I am affiliated has furnished care, services or assistance to the patient.  My signature below is made on behalf of the patient pursuant to 42 .36(b)(4). In accordance with 42 .37, the specific reason(s) that the patient is physically or mentally incapable of signing the claim for is as follows:     Signature of Physician or Healthcare Professional  Date/Time:        (For Scheduled repetitive transport, this form is not valid for transports performed more than 60 days after this date).                                                                                                                                            --------------------------------------------------------------------------------------------  Printed Name and Credentials of Physician or Authorized Healthcare Professional     *Form must be signed by patient's attending physician for scheduled, repetitive transports,.  For non-repetitive ambulance transports, if unable to obtain the signature of the attending physician, any of the following may sign (please select below):     Physician  Clinical Nurse Specialist  Registered Nurse     Physician Assistant   Discharge Planner  Licensed Practical Nurse     Nurse Practitioner

## 2021-02-08 NOTE — PLAN OF CARE
Goal Outcome Evaluation:  Plan of Care Reviewed With: patient  Progress: no change     Pt's confused, alert to self and cooperative. Bed alarm on for safety. Nurse held 24 U Lantus last night bc pt's BS dropped 50 the day prior. Nurse monitored BS throughout the night, this morning BS is 97, orange juice given and holding slading scale dose this AM. Pt's BP elevated throughout the night: scheduled dose of 100 mg metoprolol given, and PO 25 mg hydralazine given at midnight, not effective. Per attending to call back if SBP greater than 180. Nurse will continue to monitor.

## 2021-02-08 NOTE — PROGRESS NOTES
Name: Jn Beauchamp ADMIT: 2021   : 1934  PCP: Edil Gilliam MD    MRN: [id number] LOS: 2 days   AGE/SEX: 86 y.o. male  ROOM: [facility]     Subjective   Subjective     Patient seen at bedside, lying in bed, appears very sleepy.       Objective   Objective   Vital Signs  Temp:  [97.2 °F (36.2 °C)-98.9 °F (37.2 °C)] 98.9 °F (37.2 °C)  Heart Rate:  [67-91] 74  Resp:  [16-18] 16  BP: (113-205)/(53-98) 113/58  SpO2:  [96 %-100 %] 99 %  on   ;   Device (Oxygen Therapy): room air  Body mass index is 27.1 kg/m².  Physical Exam    General:  appears sedated  Head and ENT: normocephalic and atraumatic.  Lungs: symmetric expansion and equal air entry bilaterally.  Heart: regular rate, rhythm, no murmurs.  Abdomen: soft, nontender, bowel sounds present.  Extremities:  No clubbing, cyanosis or edema.  Neurologic:   unable to evaluate because of altered mental status  Psychiatry:   unable to evaluate because of altered mental status  Skin:  Warm and no rash.    Results Review     I reviewed the patient's new clinical results.  Results from last 7 days   Lab Units 21  0521  0607 21  2143   WBC 10*3/mm3 7.25 8.79 9.20   HEMOGLOBIN g/dL 11.4* 10.5* 11.1*   PLATELETS 10*3/mm3 240 224 234     Results from last 7 days   Lab Units 21  0521  0607 21  2143   SODIUM mmol/L 142 145 144   POTASSIUM mmol/L 3.3* 3.4* 4.5   CHLORIDE mmol/L 109* 111* 109*   CO2 mmol/L 24.4 23.5 24.2   BUN mg/dL 28* 32* 35*   CREATININE mg/dL 1.66* 1.83* 2.05*   GLUCOSE mg/dL 96 45* 224*   Estimated Creatinine Clearance: 35.5 mL/min (A) (by C-G formula based on SCr of 1.66 mg/dL (H)).  Results from last 7 days   Lab Units 21  2143   ALBUMIN g/dL 3.70   BILIRUBIN mg/dL <0.2   ALK PHOS U/L 108   AST (SGOT) U/L 12   ALT (SGPT) U/L 6     Results from last 7 days   Lab Units 21  0521 21  0607 21  2143   CALCIUM mg/dL 9.2 9.5 9.5   ALBUMIN g/dL  --   --  3.70       COVID19   Date  Value Ref Range Status   02/06/2021 Not Detected Not Detected - Ref. Range Final     Hemoglobin A1C   Date/Time Value Ref Range Status   02/07/2021 0607 8.40 (H) 4.80 - 5.60 % Final     Glucose   Date/Time Value Ref Range Status   02/08/2021 1126 294 (H) 70 - 130 mg/dL Final   02/08/2021 0550 97 70 - 130 mg/dL Final   02/07/2021 2250 233 (H) 70 - 130 mg/dL Final   02/07/2021 2006 247 (H) 70 - 130 mg/dL Final   02/07/2021 1631 158 (H) 70 - 130 mg/dL Final   02/07/2021 1321 153 (H) 70 - 130 mg/dL Final   02/07/2021 1208 189 (H) 70 - 130 mg/dL Final       CT Cervical Spine Without Contrast  Narrative: EMERGENCY NONCONTRAST HEAD CT AND NONCONTRAST CERVICAL SPINE CT  10/25/2019     CLINICAL HISTORY: Patient fell, hit head, abrasion to right occipital  region, has headache and neck pain.     HEAD CT     TECHNIQUE: Spiral CT images were obtained from the base of the skull  through the vertex without intravenous contrast. Images were reformatted  and are submitted in 3 mm thick axial CT sections were brain algorithm.  2 mm thick axial CT sections with high-resolution bone algorithm and 2  mm thick sagittal and coronal reconstructions were performed and  submitted in brain algorithm.     There are no prior head CTs for comparison.     FINDINGS: There are patchy areas of low-density extending from  periventricular into the subcortical white matter of the cerebral  hemispheres consistent with mild-to-moderate small vessel disease. There  is a focal area of encephalomalacia in the superior left frontal gyrus  compatible with an old superior left frontal lobe infarct measuring 13 x  10 mm in size in the distribution of the superior frontal branches of  left middle cerebral artery territory and there is an additional 16 x 14  x 12 mm area of encephalomalacia in the superior lateral left occipital  lobe in the distribution of the superior lateral occipital branches of  the inferior division left middle cerebral artery territory.  There is a  cystic area of encephalomalacia tracking throughout the left putamen,  some extension into the anterior limb of the left internal capsule and  the left external capsule and the area measures 3.6 x 1 x 1.9 cm in  anteroposterior, mediolateral and craniocaudal dimension compatible with  sequelae of an old infarct or hemorrhage at this site. There is diffuse  cerebral atrophy via volume loss in the left side. The left lateral  ventricle is larger than the right and ventricles are mildly prominent  in size felt to be due to central volume loss or atrophy. I see no mass  effect, no midline shift and no extra-axial fluid collections are  identified. There is no evidence of acute intracranial hemorrhage. No  acute skull fracture is identified. Paranasal sinuses and mastoid air  cells and middle ear cavities are clear. Calcified plaques are present  in the cavernous segments of the internal carotid arteries bilaterally  as well as the intracranial segments of the distal vertebral arteries.     Impression:    1. No acute abnormality is seen. Specifically no acute skull fracture or  intracranial hemorrhage is identified.     2. There is mild-to-moderate small vessel disease in the cerebral white  matter. There is a 13 x 10 mm old superior left frontal cortical infarct  in the left middle cerebral artery territory and an additional 16 x 14  mm old superior lateral left occipital lobe infarct in the left middle  cerebral artery territory. There is cystic area of encephalomalacia  tracking throughout the majority of the left putamen into the anterior  limb of the left internal capsule and left external capsule that  measures 3.6 x 1 x 1.8 cm and is the sequela of an old infarct or old  hemorrhage. Correlation with clinical history is suggested.     3. There is diffuse cerebral atrophy and prominent calcified plaques in  the intracranial segments of the distal vertebral arteries and cavernous  and supracavernous  segments of the internal carotid arteries  bilaterally. The remainder of the head CT is within normal limits.        CERVICAL SPINE CT      TECHNIQUE: Spiral CT images were obtained from the skull base down to  the T2 thoracic level and images were reformatted and submitted in 2 mm  thick axial, sagittal and coronal CT sections with soft tissue algorithm  and 1 mm thick axial, sagittal and coronal CT sections with  high-resolution bone algorithm.     FINDINGS: The craniocervical junction is normal in appearance. There are  arthritic changes at the atlantodental interval with marginal spurring  off the anterior ring of C1 and the odontoid process. Otherwise, the  C1-2 level is normal in appearance.     At C2-3 the disc space is normal. There is mild-to-moderate right facet  overgrowth. The left facets and uncovertebral joints are normal and  there is no canal or foraminal narrowing.     At C3-4 there is mild right and there is moderate left facet overgrowth,  minimal posterior spurring and mild bilateral uncovertebral joint  hypertrophy. There is mild canal and there is mild right and moderate  left bony foraminal narrowing.     At C4-5 there is minimal left and there is moderate right facet  overgrowth, mild posterior endplate spurring. There is mild canal and  mild-to-moderate right bony foraminal narrowing.     At C5-6 there is minimal left and there is moderate-to-severe right  facet overgrowth, mild disc space narrowing and degenerative endplate  changes. Posterior disc osteophyte complex mild to moderately narrows  the canal. There appears to be a left posterolateral to medial foraminal  disc herniation that presses on the the left C6 nerve root as it extends  from the cord toward the foramen, compresses the C6 nerve root as it  enters the left foramen at C5-6. There is also moderate right bony  foraminal narrowing.     At C6-7 there is calcification of the disc space, some bony bridging  across the endplates  that may be degenerative in origin or possibly  related to prior surgery. There is some calcification of the posterior  longitudinal ligament along the midline of the posterior inferior body  of C6 and posterior superior body of C7. There is mild-to-moderate  narrowing of the central portion of the canal, some uncovertebral joint  hypertrophy. The facets are normal. There is mild right and  mild-to-moderate left bony foraminal narrowing.     At C7-T1 there is mild right facet overgrowth. The posterior disc margin  is normal. There is no canal or foraminal narrowing.     No acute fracture seen in the cervical spine.     IMPRESSION:  No acute fracture is seen in cervical spine. There is cervical  spondylosis as described above.      The results were communicated to Dr. Elvin Trejo from the  emergency room by telephone 10/25/2019 at 10:20 AM.     Radiation dose reduction techniques were utilized, including automated  exposure control and exposure modulation based on body size.     This report was finalized on 10/25/2019 2:40 PM by Dr. Kenny Melgar M.D.     CT Head Without Contrast  Narrative: EMERGENCY NONCONTRAST HEAD CT AND NONCONTRAST CERVICAL SPINE CT  10/25/2019     CLINICAL HISTORY: Patient fell, hit head, abrasion to right occipital  region, has headache and neck pain.     HEAD CT     TECHNIQUE: Spiral CT images were obtained from the base of the skull  through the vertex without intravenous contrast. Images were reformatted  and are submitted in 3 mm thick axial CT sections were brain algorithm.  2 mm thick axial CT sections with high-resolution bone algorithm and 2  mm thick sagittal and coronal reconstructions were performed and  submitted in brain algorithm.     There are no prior head CTs for comparison.     FINDINGS: There are patchy areas of low-density extending from  periventricular into the subcortical white matter of the cerebral  hemispheres consistent with mild-to-moderate small vessel  disease. There  is a focal area of encephalomalacia in the superior left frontal gyrus  compatible with an old superior left frontal lobe infarct measuring 13 x  10 mm in size in the distribution of the superior frontal branches of  left middle cerebral artery territory and there is an additional 16 x 14  x 12 mm area of encephalomalacia in the superior lateral left occipital  lobe in the distribution of the superior lateral occipital branches of  the inferior division left middle cerebral artery territory. There is a  cystic area of encephalomalacia tracking throughout the left putamen,  some extension into the anterior limb of the left internal capsule and  the left external capsule and the area measures 3.6 x 1 x 1.9 cm in  anteroposterior, mediolateral and craniocaudal dimension compatible with  sequelae of an old infarct or hemorrhage at this site. There is diffuse  cerebral atrophy via volume loss in the left side. The left lateral  ventricle is larger than the right and ventricles are mildly prominent  in size felt to be due to central volume loss or atrophy. I see no mass  effect, no midline shift and no extra-axial fluid collections are  identified. There is no evidence of acute intracranial hemorrhage. No  acute skull fracture is identified. Paranasal sinuses and mastoid air  cells and middle ear cavities are clear. Calcified plaques are present  in the cavernous segments of the internal carotid arteries bilaterally  as well as the intracranial segments of the distal vertebral arteries.     Impression:    1. No acute abnormality is seen. Specifically no acute skull fracture or  intracranial hemorrhage is identified.     2. There is mild-to-moderate small vessel disease in the cerebral white  matter. There is a 13 x 10 mm old superior left frontal cortical infarct  in the left middle cerebral artery territory and an additional 16 x 14  mm old superior lateral left occipital lobe infarct in the left  middle  cerebral artery territory. There is cystic area of encephalomalacia  tracking throughout the majority of the left putamen into the anterior  limb of the left internal capsule and left external capsule that  measures 3.6 x 1 x 1.8 cm and is the sequela of an old infarct or old  hemorrhage. Correlation with clinical history is suggested.     3. There is diffuse cerebral atrophy and prominent calcified plaques in  the intracranial segments of the distal vertebral arteries and cavernous  and supracavernous segments of the internal carotid arteries  bilaterally. The remainder of the head CT is within normal limits.        CERVICAL SPINE CT      TECHNIQUE: Spiral CT images were obtained from the skull base down to  the T2 thoracic level and images were reformatted and submitted in 2 mm  thick axial, sagittal and coronal CT sections with soft tissue algorithm  and 1 mm thick axial, sagittal and coronal CT sections with  high-resolution bone algorithm.     FINDINGS: The craniocervical junction is normal in appearance. There are  arthritic changes at the atlantodental interval with marginal spurring  off the anterior ring of C1 and the odontoid process. Otherwise, the  C1-2 level is normal in appearance.     At C2-3 the disc space is normal. There is mild-to-moderate right facet  overgrowth. The left facets and uncovertebral joints are normal and  there is no canal or foraminal narrowing.     At C3-4 there is mild right and there is moderate left facet overgrowth,  minimal posterior spurring and mild bilateral uncovertebral joint  hypertrophy. There is mild canal and there is mild right and moderate  left bony foraminal narrowing.     At C4-5 there is minimal left and there is moderate right facet  overgrowth, mild posterior endplate spurring. There is mild canal and  mild-to-moderate right bony foraminal narrowing.     At C5-6 there is minimal left and there is moderate-to-severe right  facet overgrowth, mild disc  space narrowing and degenerative endplate  changes. Posterior disc osteophyte complex mild to moderately narrows  the canal. There appears to be a left posterolateral to medial foraminal  disc herniation that presses on the the left C6 nerve root as it extends  from the cord toward the foramen, compresses the C6 nerve root as it  enters the left foramen at C5-6. There is also moderate right bony  foraminal narrowing.     At C6-7 there is calcification of the disc space, some bony bridging  across the endplates that may be degenerative in origin or possibly  related to prior surgery. There is some calcification of the posterior  longitudinal ligament along the midline of the posterior inferior body  of C6 and posterior superior body of C7. There is mild-to-moderate  narrowing of the central portion of the canal, some uncovertebral joint  hypertrophy. The facets are normal. There is mild right and  mild-to-moderate left bony foraminal narrowing.     At C7-T1 there is mild right facet overgrowth. The posterior disc margin  is normal. There is no canal or foraminal narrowing.     No acute fracture seen in the cervical spine.     IMPRESSION:  No acute fracture is seen in cervical spine. There is cervical  spondylosis as described above.      The results were communicated to Dr. Elvin Trejo from the  emergency room by telephone 10/25/2019 at 10:20 AM.     Radiation dose reduction techniques were utilized, including automated  exposure control and exposure modulation based on body size.     This report was finalized on 10/25/2019 2:40 PM by Dr. Kenny Melgar M.D.       Scheduled Medications  amLODIPine, 5 mg, Oral, Daily  aspirin, 81 mg, Oral, Daily  atorvastatin, 20 mg, Oral, Nightly  cefTRIAXone, 1 g, Intravenous, Q24H  clopidogrel, 75 mg, Oral, Nightly  docusate sodium, 100 mg, Oral, BID  insulin lispro, 0-9 Units, Subcutaneous, TID AC  metoprolol succinate XL, 100 mg, Oral, BID  mirtazapine, 7.5 mg, Oral,  Nightly  pantoprazole, 40 mg, Oral, QAM  QUEtiapine, 25 mg, Oral, Nightly  tamsulosin, 0.4 mg, Oral, Daily    Infusions   Diet  Diet Pureed       Assessment/Plan     Active Hospital Problems    Diagnosis  POA   • **Dementia with behavioral disturbance (CMS/[facility]) [F03.91]  Yes   • Type 2 diabetes mellitus with hyperglycemia, without long-term current use of insulin (CMS/[facility]) [E11.65]  Yes   • History of CVA (cerebrovascular accident) [Z86.73]  Not Applicable   • PVD (peripheral vascular disease) (CMS/[facility]) [I73.9]  Yes   • Paroxysmal atrial fibrillation (CMS/[facility]) [I48.0]  Yes   • GERD (gastroesophageal reflux disease) [K21.9]  Yes   • HLD (hyperlipidemia) [E78.5]  Yes   • COPD (chronic obstructive pulmonary disease) (CMS/[facility]) [J44.9]  Yes      Resolved Hospital Problems   No resolved problems to display.       86 y.o. male admitted with Dementia with behavioral disturbance (CMS/[facility]).     Assessment and plan  1. Dementia with behavior disturbance, patient has underlying UTI, psychiatry has evaluated the patient. Continue antibiotics for management of UTI as well as stabilize blood glucose.  2.  Poorly-controlled diabetes mellitus, A1c is noted to be above 8, continue Accu-Cheks and sliding scale insulin coverage.  Hypoglycemia was noted, Lantus has been temporarily discontinued.  We will resume and titrate Lantus based on blood glucose trend.  3.  History of CVA, continue aspirin and statin therapy.  4. Paroxysmal atrial fibrillation, he appears to be rate controlled at this point of time.   5. Hypertension, continue Toprol-XL.  6.  Further plans based on hospital course.    Luis Miguel Braswell MD  [facility]  02/08/21  15:20 EST

## 2021-02-08 NOTE — PROGRESS NOTES
Discharge Planning Assessment  [facility]     Patient Name: Jn Beauchamp  MRN: [id number]  Today's Date: 2/8/2021    Admit Date: 2/6/2021    Discharge Needs Assessment    No documentation.       Discharge Plan     Row Name 02/08/21 1340       Plan    Plan Comments  The patient transferred to [facility] from ER. The patient is from [place]. CCP will need to screen and follow. LYNN Crowley RN, CCP.        Continued Care and Services - Admitted Since 2/6/2021    Coordination has not been started for this encounter.         Demographic Summary    No documentation.       Functional Status    No documentation.       Psychosocial    No documentation.       Abuse/Neglect    No documentation.       Legal    No documentation.       Substance Abuse    No documentation.       Patient Forms    No documentation.           Hillary Crowley RN

## 2021-02-08 NOTE — NURSING NOTE
"Nurse send page to [facility] re: pt's elevated BP after given scheduled metropolol 100 mg at 2108. Pt's Manual BP is 178/80.    Nurse spoke with Bree Parr which stated \"I will put some oral Hydralazine for him\".  "

## 2021-02-08 NOTE — PROGRESS NOTES
Discharge Planning Assessment  [facility]     Patient Name: Jn Beauchamp  MRN: [id number]  Today's Date: 2/8/2021    Admit Date: 2/6/2021    Discharge Needs Assessment     Row Name 02/08/21 1536       Living Environment    Lives With  facility resident    Name(s) of Who Lives With Patient  [facility] private [facility]    Provides Primary Care For  no one, unable/limited ability to care for self    Family Caregiver if Needed  child(chinmay), adult    Family Caregiver Names  Oksana Rawls daughter [phone]    Quality of Family Relationships  involved;helpful;supportive    Able to Return to Prior Arrangements  yes       Transition Planning    Patient/Family Anticipates Transition to  long-term care facility       Discharge Needs Assessment    Current Outpatient/Agency/Support Group  long-term acute care facility    Provided Post Acute Provider List?  N/A        Discharge Plan     Row Name 02/08/21 2168       Plan    Plan  Return to [facility]    Plan Comments  Call placed to daughter Oksana Rawls [phone] regarding discharge planning, she stated patient will return to [facility] at discharge. Daughter stated patient will need EMS transport at discharge. Call placed to Emely Par[facility] [phone] she stated patient can return he is long term private pay patient. Packet and EMS note in CCP office. Jennyfer Hwang RN    Row Name 02/08/21 1634       Plan    Plan Comments  The [place] [facility] [place] The patient is from [facility]. CCP will need to screen and follow. LYNN Crowley RN, CCP.        Continued Care and Services - Admitted Since 2/6/2021     Destination Coordination complete    Service Provider Request Status Selected Services Address Phone Fax Patient Preferred    [facility]   Selected Skilled Nursing [address] [place] [id number] [phone] --                Demographic Summary     Row Name 02/08/21  1531       General Information    Preferred Language  English    Row Name 02/08/21 1529       General Information    Admission Type  inpatient    Arrived From  emergency department    Referral Source  admission list    Reason for Consult  discharge planning    Preferred Language  English     Used During This Interaction  no        Functional Status     Row Name 02/08/21 1530       Functional Status    Usual Activity Tolerance  poor    Current Activity Tolerance  poor       Functional Status, IADL    Medications  completely dependent    Meal Preparation  completely dependent    Housekeeping  completely dependent    Laundry  completely dependent    Shopping  completely dependent        Psychosocial    No documentation.       Abuse/Neglect    No documentation.       Legal    No documentation.       Substance Abuse    No documentation.       Patient Forms    No documentation.           Jennyfer Hwang RN

## 2021-02-09 LAB
ANION GAP SERPL CALCULATED.3IONS-SCNC: 9.1 MMOL/L (ref 5–15)
BASOPHILS # BLD AUTO: 0.09 10*3/MM3 (ref 0–0.2)
BASOPHILS NFR BLD AUTO: 1.3 % (ref 0–1.5)
BUN SERPL-MCNC: 26 MG/DL (ref 8–23)
BUN/CREAT SERPL: 15.8 (ref 7–25)
CALCIUM SPEC-SCNC: 9.4 MG/DL (ref 8.6–10.5)
CHLORIDE SERPL-SCNC: 103 MMOL/L (ref 98–107)
CO2 SERPL-SCNC: 24.9 MMOL/L (ref 22–29)
CREAT SERPL-MCNC: 1.65 MG/DL (ref 0.76–1.27)
DEPRECATED RDW RBC AUTO: 48.4 FL (ref 37–54)
EOSINOPHIL # BLD AUTO: 0.46 10*3/MM3 (ref 0–0.4)
EOSINOPHIL NFR BLD AUTO: 6.5 % (ref 0.3–6.2)
ERYTHROCYTE [DISTWIDTH] IN BLOOD BY AUTOMATED COUNT: 14.6 % (ref 12.3–15.4)
GFR SERPL CREATININE-BSD FRML MDRD: 40 ML/MIN/1.73
GLUCOSE BLDC GLUCOMTR-MCNC: 176 MG/DL (ref 70–130)
GLUCOSE BLDC GLUCOMTR-MCNC: 201 MG/DL (ref 70–130)
GLUCOSE BLDC GLUCOMTR-MCNC: 202 MG/DL (ref 70–130)
GLUCOSE BLDC GLUCOMTR-MCNC: 225 MG/DL (ref 70–130)
GLUCOSE SERPL-MCNC: 165 MG/DL (ref 65–99)
HCT VFR BLD AUTO: 36.3 % (ref 37.5–51)
HGB BLD-MCNC: 11.1 G/DL (ref 13–17.7)
IMM GRANULOCYTES # BLD AUTO: 0.05 10*3/MM3 (ref 0–0.05)
IMM GRANULOCYTES NFR BLD AUTO: 0.7 % (ref 0–0.5)
LYMPHOCYTES # BLD AUTO: 2.05 10*3/MM3 (ref 0.7–3.1)
LYMPHOCYTES NFR BLD AUTO: 29.2 % (ref 19.6–45.3)
MCH RBC QN AUTO: 27.5 PG (ref 26.6–33)
MCHC RBC AUTO-ENTMCNC: 30.6 G/DL (ref 31.5–35.7)
MCV RBC AUTO: 90.1 FL (ref 79–97)
MONOCYTES # BLD AUTO: 0.44 10*3/MM3 (ref 0.1–0.9)
MONOCYTES NFR BLD AUTO: 6.3 % (ref 5–12)
NEUTROPHILS NFR BLD AUTO: 3.94 10*3/MM3 (ref 1.7–7)
NEUTROPHILS NFR BLD AUTO: 56 % (ref 42.7–76)
NRBC BLD AUTO-RTO: 0.1 /100 WBC (ref 0–0.2)
PLATELET # BLD AUTO: 195 10*3/MM3 (ref 140–450)
PMV BLD AUTO: 11.1 FL (ref 6–12)
POTASSIUM SERPL-SCNC: 3.8 MMOL/L (ref 3.5–5.2)
RBC # BLD AUTO: 4.03 10*6/MM3 (ref 4.14–5.8)
SODIUM SERPL-SCNC: 137 MMOL/L (ref 136–145)
WBC # BLD AUTO: 7.03 10*3/MM3 (ref 3.4–10.8)

## 2021-02-09 PROCEDURE — 25010000002 CEFTRIAXONE PER 250 MG: Performed by: NURSE PRACTITIONER

## 2021-02-09 PROCEDURE — 80048 BASIC METABOLIC PNL TOTAL CA: CPT | Performed by: INTERNAL MEDICINE

## 2021-02-09 PROCEDURE — 63710000001 INSULIN LISPRO (HUMAN) PER 5 UNITS: Performed by: NURSE PRACTITIONER

## 2021-02-09 PROCEDURE — 82962 GLUCOSE BLOOD TEST: CPT

## 2021-02-09 PROCEDURE — 85025 COMPLETE CBC W/AUTO DIFF WBC: CPT | Performed by: INTERNAL MEDICINE

## 2021-02-09 RX ADMIN — ATORVASTATIN CALCIUM 20 MG: 20 TABLET, FILM COATED ORAL at 21:17

## 2021-02-09 RX ADMIN — INSULIN LISPRO 4 UNITS: 100 INJECTION, SOLUTION INTRAVENOUS; SUBCUTANEOUS at 17:22

## 2021-02-09 RX ADMIN — CEFTRIAXONE SODIUM 1 G: 1 INJECTION, SOLUTION INTRAVENOUS at 21:19

## 2021-02-09 RX ADMIN — METOPROLOL SUCCINATE 100 MG: 100 TABLET, EXTENDED RELEASE ORAL at 21:17

## 2021-02-09 RX ADMIN — TAMSULOSIN HYDROCHLORIDE 0.4 MG: 0.4 CAPSULE ORAL at 08:19

## 2021-02-09 RX ADMIN — DOCUSATE SODIUM 100 MG: 100 CAPSULE, LIQUID FILLED ORAL at 21:18

## 2021-02-09 RX ADMIN — PANTOPRAZOLE SODIUM 40 MG: 40 TABLET, DELAYED RELEASE ORAL at 06:14

## 2021-02-09 RX ADMIN — CLOPIDOGREL 75 MG: 75 TABLET, FILM COATED ORAL at 21:17

## 2021-02-09 RX ADMIN — QUETIAPINE FUMARATE 25 MG: 25 TABLET ORAL at 21:17

## 2021-02-09 RX ADMIN — METOPROLOL SUCCINATE 100 MG: 100 TABLET, EXTENDED RELEASE ORAL at 08:19

## 2021-02-09 RX ADMIN — INSULIN LISPRO 4 UNITS: 100 INJECTION, SOLUTION INTRAVENOUS; SUBCUTANEOUS at 12:29

## 2021-02-09 RX ADMIN — INSULIN LISPRO 2 UNITS: 100 INJECTION, SOLUTION INTRAVENOUS; SUBCUTANEOUS at 08:25

## 2021-02-09 RX ADMIN — AMLODIPINE BESYLATE 5 MG: 5 TABLET ORAL at 08:19

## 2021-02-09 RX ADMIN — MIRTAZAPINE 7.5 MG: 15 TABLET, FILM COATED ORAL at 21:18

## 2021-02-09 RX ADMIN — ASPIRIN 81 MG: 81 TABLET, CHEWABLE ORAL at 08:19

## 2021-02-09 RX ADMIN — DOCUSATE SODIUM 100 MG: 100 CAPSULE, LIQUID FILLED ORAL at 08:19

## 2021-02-09 NOTE — PLAN OF CARE
Goal Outcome Evaluation:  Plan of Care Reviewed With: patient     Outcome Summary: Patient only oriented to self. VSS. RA. No c/o of pain. No falls. q2 Turn. Patient currently resting comfortbly with no complaints.

## 2021-02-09 NOTE — CONSULTS
The patient is again noted to be resting comfortably.  The chart indicates no issues with behavior and indicates that the patient will return to [facility] and was discharged.

## 2021-02-09 NOTE — PLAN OF CARE
Goal Outcome Evaluation:        Outcome Summary: patient has been pleasant, except during breif changes. can swing at you if you cause him pain. easily redirected. a/o to self. feeds self well. appetite good. can bear weight. assisted to bsc with assist x1.

## 2021-02-10 LAB
GLUCOSE BLDC GLUCOMTR-MCNC: 213 MG/DL (ref 70–130)
GLUCOSE BLDC GLUCOMTR-MCNC: 219 MG/DL (ref 70–130)
GLUCOSE BLDC GLUCOMTR-MCNC: 220 MG/DL (ref 70–130)
GLUCOSE BLDC GLUCOMTR-MCNC: 264 MG/DL (ref 70–130)

## 2021-02-10 PROCEDURE — 82962 GLUCOSE BLOOD TEST: CPT

## 2021-02-10 PROCEDURE — 63710000001 INSULIN LISPRO (HUMAN) PER 5 UNITS: Performed by: NURSE PRACTITIONER

## 2021-02-10 RX ADMIN — DOCUSATE SODIUM 100 MG: 100 CAPSULE, LIQUID FILLED ORAL at 08:31

## 2021-02-10 RX ADMIN — ASPIRIN 81 MG: 81 TABLET, CHEWABLE ORAL at 08:31

## 2021-02-10 RX ADMIN — PANTOPRAZOLE SODIUM 40 MG: 40 TABLET, DELAYED RELEASE ORAL at 06:51

## 2021-02-10 RX ADMIN — METOPROLOL SUCCINATE 100 MG: 100 TABLET, EXTENDED RELEASE ORAL at 20:45

## 2021-02-10 RX ADMIN — METOPROLOL SUCCINATE 100 MG: 100 TABLET, EXTENDED RELEASE ORAL at 08:31

## 2021-02-10 RX ADMIN — CLOPIDOGREL 75 MG: 75 TABLET, FILM COATED ORAL at 20:45

## 2021-02-10 RX ADMIN — ATORVASTATIN CALCIUM 20 MG: 20 TABLET, FILM COATED ORAL at 20:44

## 2021-02-10 RX ADMIN — QUETIAPINE FUMARATE 25 MG: 25 TABLET ORAL at 20:45

## 2021-02-10 RX ADMIN — INSULIN LISPRO 4 UNITS: 100 INJECTION, SOLUTION INTRAVENOUS; SUBCUTANEOUS at 12:11

## 2021-02-10 RX ADMIN — DOCUSATE SODIUM 100 MG: 100 CAPSULE, LIQUID FILLED ORAL at 20:44

## 2021-02-10 RX ADMIN — AMLODIPINE BESYLATE 5 MG: 5 TABLET ORAL at 08:31

## 2021-02-10 RX ADMIN — MIRTAZAPINE 7.5 MG: 15 TABLET, FILM COATED ORAL at 20:45

## 2021-02-10 RX ADMIN — TAMSULOSIN HYDROCHLORIDE 0.4 MG: 0.4 CAPSULE ORAL at 08:31

## 2021-02-10 RX ADMIN — INSULIN LISPRO 4 UNITS: 100 INJECTION, SOLUTION INTRAVENOUS; SUBCUTANEOUS at 16:41

## 2021-02-10 RX ADMIN — INSULIN LISPRO 4 UNITS: 100 INJECTION, SOLUTION INTRAVENOUS; SUBCUTANEOUS at 08:30

## 2021-02-10 NOTE — PLAN OF CARE
Goal Outcome Evaluation:  Plan of Care Reviewed With: patient  Progress: no change       Patient alert to self / episodes of anxiety / easily redirected  / safety precautions /  incontinent/ no signs of pain / 1 PA with ADL's /

## 2021-02-10 NOTE — CONSULTS
The patient is again noted to be sleeping soundly and cannot be awaken for interview.  The chart indicates that he has been generally cooperative with care apart from brief changes.  A palliative consult has been requested.

## 2021-02-10 NOTE — PLAN OF CARE
Goal Outcome Evaluation:     Progress: no change  Outcome Summary: Pt rested comfortably throughout the shift. [facility] accuchecks performed and insulin given per sliding scale. Brief changed as needed throughout the shift. Pt feeds self and appetite good. Daughter up to visit today, explained vital signs and physical assessment findings and discussed discharge plans. She verbalized understanding. Pt can be verbally aggressive at times, but calms down quickly. Bed alarm set on zone 2, pt attempted to get out of bed several times. Will continue to monitor vital signs and comfort.

## 2021-02-10 NOTE — PROGRESS NOTES
Continued Stay Note  [facility]     Patient Name: Jn Beauchamp  MRN: [id number]  Today's Date: 2/10/2021    Admit Date: 2/6/2021    Discharge Plan     Row Name 02/10/21 1258       Plan    Plan  Return to LTC at [place]    Plan Comments  CCP spoke with Emely/Mario Shelton; confirmed patient is LTC resident with private pay bed hold and can return at discharge. CCP updated Emely on patient possibly returning with [facility] services. Per Emely, will not need another COVID test prior to return. Candida HINDSW        Discharge Codes    No documentation.             ARTEM Marcos

## 2021-02-10 NOTE — PROGRESS NOTES
Name: Jn Beauchamp ADMIT: 2021   : 1934  PCP: Edil Gilliam MD    MRN: [id number] LOS: 4 days   AGE/SEX: 86 y.o. male  ROOM: [facility]     Subjective   Subjective     Patient seen bedside, appears to be lethargic.       Objective   Objective   Vital Signs  Temp:  [96.7 °F (35.9 °C)-98.3 °F (36.8 °C)] 98.3 °F (36.8 °C)  Heart Rate:  [60-68] 60  Resp:  [16] 16  BP: (153-188)/(61-78) 153/67  SpO2:  [96 %-100 %] 97 %  on   ;   Device (Oxygen Therapy): room air  Body mass index is 27.1 kg/m².  Physical Exam    General:  appears sedated  Head and ENT: normocephalic and atraumatic.  Lungs: symmetric expansion and equal air entry bilaterally.  Heart: regular rate, rhythm, no murmurs.  Abdomen: soft, nontender, bowel sounds present.  Extremities:  No clubbing, cyanosis or edema.  Neurologic:   unable to evaluate because of altered mental status  Psychiatry:   unable to evaluate because of altered mental status  Skin:  Warm and no rash.    Results Review     I reviewed the patient's new clinical results.  Results from last 7 days   Lab Units  21  0521  0621  2143   WBC 10*3/mm3  7.25 8.79 9.20   HEMOGLOBIN g/dL  11.4* 10.5* 11.1*   PLATELETS 10*3/mm3  240 224 234     Estimated Creatinine Clearance: 35.7 mL/min (A) (by C-G formula based on SCr of 1.65 mg/dL (H)).  Results from last 7 days   Lab Units 21  2143   ALBUMIN g/dL 3.70   BILIRUBIN mg/dL <0.2   ALK PHOS U/L 108   AST (SGOT) U/L 12   ALT (SGPT) U/L 6     Results from last 7 days   Lab Units 21  0640 21  0521 21  0607 21  2143   CALCIUM mg/dL 9.4 9.2 9.5 9.5   ALBUMIN g/dL  --   --   --  3.70       COVID19   Date Value Ref Range Status   2021 Not Detected Not Detected - Ref. Range Final     Glucose   Date/Time Value Ref Range Status   02/10/2021 1128 220 (H) 70 - 130 mg/dL Final   02/10/2021 0601 213 (H) 70 - 130 mg/dL Final   2021 2017 225 (H) 70 - 130 mg/dL Final   2021  1657 202 (H) 70 - 130 mg/dL Final   02/09/2021 1151 201 (H) 70 - 130 mg/dL Final   02/09/2021 0614 176 (H) 70 - 130 mg/dL Final   02/08/2021 2021 301 (H) 70 - 130 mg/dL Final       CT Cervical Spine Without Contrast  Narrative: EMERGENCY NONCONTRAST HEAD CT AND NONCONTRAST CERVICAL SPINE CT  10/25/2019     CLINICAL HISTORY: Patient fell, hit head, abrasion to right occipital  region, has headache and neck pain.     HEAD CT     TECHNIQUE: Spiral CT images were obtained from the base of the skull  through the vertex without intravenous contrast. Images were reformatted  and are submitted in 3 mm thick axial CT sections were brain algorithm.  2 mm thick axial CT sections with high-resolution bone algorithm and 2  mm thick sagittal and coronal reconstructions were performed and  submitted in brain algorithm.     There are no prior head CTs for comparison.     FINDINGS: There are patchy areas of low-density extending from  periventricular into the subcortical white matter of the cerebral  hemispheres consistent with mild-to-moderate small vessel disease. There  is a focal area of encephalomalacia in the superior left frontal gyrus  compatible with an old superior left frontal lobe infarct measuring 13 x  10 mm in size in the distribution of the superior frontal branches of  left middle cerebral artery territory and there is an additional 16 x 14  x 12 mm area of encephalomalacia in the superior lateral left occipital  lobe in the distribution of the superior lateral occipital branches of  the inferior division left middle cerebral artery territory. There is a  cystic area of encephalomalacia tracking throughout the left putamen,  some extension into the anterior limb of the left internal capsule and  the left external capsule and the area measures 3.6 x 1 x 1.9 cm in  anteroposterior, mediolateral and craniocaudal dimension compatible with  sequelae of an old infarct or hemorrhage at this site. There is diffuse  cerebral  atrophy via volume loss in the left side. The left lateral  ventricle is larger than the right and ventricles are mildly prominent  in size felt to be due to central volume loss or atrophy. I see no mass  effect, no midline shift and no extra-axial fluid collections are  identified. There is no evidence of acute intracranial hemorrhage. No  acute skull fracture is identified. Paranasal sinuses and mastoid air  cells and middle ear cavities are clear. Calcified plaques are present  in the cavernous segments of the internal carotid arteries bilaterally  as well as the intracranial segments of the distal vertebral arteries.     Impression:    1. No acute abnormality is seen. Specifically no acute skull fracture or  intracranial hemorrhage is identified.     2. There is mild-to-moderate small vessel disease in the cerebral white  matter. There is a 13 x 10 mm old superior left frontal cortical infarct  in the left middle cerebral artery territory and an additional 16 x 14  mm old superior lateral left occipital lobe infarct in the left middle  cerebral artery territory. There is cystic area of encephalomalacia  tracking throughout the majority of the left putamen into the anterior  limb of the left internal capsule and left external capsule that  measures 3.6 x 1 x 1.8 cm and is the sequela of an old infarct or old  hemorrhage. Correlation with clinical history is suggested.     3. There is diffuse cerebral atrophy and prominent calcified plaques in  the intracranial segments of the distal vertebral arteries and cavernous  and supracavernous segments of the internal carotid arteries  bilaterally. The remainder of the head CT is within normal limits.        CERVICAL SPINE CT      TECHNIQUE: Spiral CT images were obtained from the skull base down to  the T2 thoracic level and images were reformatted and submitted in 2 mm  thick axial, sagittal and coronal CT sections with soft tissue algorithm  and 1 mm thick axial,  sagittal and coronal CT sections with  high-resolution bone algorithm.     FINDINGS: The craniocervical junction is normal in appearance. There are  arthritic changes at the atlantodental interval with marginal spurring  off the anterior ring of C1 and the odontoid process. Otherwise, the  C1-2 level is normal in appearance.     At C2-3 the disc space is normal. There is mild-to-moderate right facet  overgrowth. The left facets and uncovertebral joints are normal and  there is no canal or foraminal narrowing.     At C3-4 there is mild right and there is moderate left facet overgrowth,  minimal posterior spurring and mild bilateral uncovertebral joint  hypertrophy. There is mild canal and there is mild right and moderate  left bony foraminal narrowing.     At C4-5 there is minimal left and there is moderate right facet  overgrowth, mild posterior endplate spurring. There is mild canal and  mild-to-moderate right bony foraminal narrowing.     At C5-6 there is minimal left and there is moderate-to-severe right  facet overgrowth, mild disc space narrowing and degenerative endplate  changes. Posterior disc osteophyte complex mild to moderately narrows  the canal. There appears to be a left posterolateral to medial foraminal  disc herniation that presses on the the left C6 nerve root as it extends  from the cord toward the foramen, compresses the C6 nerve root as it  enters the left foramen at C5-6. There is also moderate right bony  foraminal narrowing.     At C6-7 there is calcification of the disc space, some bony bridging  across the endplates that may be degenerative in origin or possibly  related to prior surgery. There is some calcification of the posterior  longitudinal ligament along the midline of the posterior inferior body  of C6 and posterior superior body of C7. There is mild-to-moderate  narrowing of the central portion of the canal, some uncovertebral joint  hypertrophy. The facets are normal. There is  mild right and  mild-to-moderate left bony foraminal narrowing.     At C7-T1 there is mild right facet overgrowth. The posterior disc margin  is normal. There is no canal or foraminal narrowing.     No acute fracture seen in the cervical spine.     IMPRESSION:  No acute fracture is seen in cervical spine. There is cervical  spondylosis as described above.      The results were communicated to Dr. Elvin Trejo from the  emergency room by telephone 10/25/2019 at 10:20 AM.     Radiation dose reduction techniques were utilized, including automated  exposure control and exposure modulation based on body size.     This report was finalized on 10/25/2019 2:40 PM by Dr. Kenny Melgar M.D.     CT Head Without Contrast  Narrative: EMERGENCY NONCONTRAST HEAD CT AND NONCONTRAST CERVICAL SPINE CT  10/25/2019     CLINICAL HISTORY: Patient fell, hit head, abrasion to right occipital  region, has headache and neck pain.     HEAD CT     TECHNIQUE: Spiral CT images were obtained from the base of the skull  through the vertex without intravenous contrast. Images were reformatted  and are submitted in 3 mm thick axial CT sections were brain algorithm.  2 mm thick axial CT sections with high-resolution bone algorithm and 2  mm thick sagittal and coronal reconstructions were performed and  submitted in brain algorithm.     There are no prior head CTs for comparison.     FINDINGS: There are patchy areas of low-density extending from  periventricular into the subcortical white matter of the cerebral  hemispheres consistent with mild-to-moderate small vessel disease. There  is a focal area of encephalomalacia in the superior left frontal gyrus  compatible with an old superior left frontal lobe infarct measuring 13 x  10 mm in size in the distribution of the superior frontal branches of  left middle cerebral artery territory and there is an additional 16 x 14  x 12 mm area of encephalomalacia in the superior lateral left  occipital  lobe in the distribution of the superior lateral occipital branches of  the inferior division left middle cerebral artery territory. There is a  cystic area of encephalomalacia tracking throughout the left putamen,  some extension into the anterior limb of the left internal capsule and  the left external capsule and the area measures 3.6 x 1 x 1.9 cm in  anteroposterior, mediolateral and craniocaudal dimension compatible with  sequelae of an old infarct or hemorrhage at this site. There is diffuse  cerebral atrophy via volume loss in the left side. The left lateral  ventricle is larger than the right and ventricles are mildly prominent  in size felt to be due to central volume loss or atrophy. I see no mass  effect, no midline shift and no extra-axial fluid collections are  identified. There is no evidence of acute intracranial hemorrhage. No  acute skull fracture is identified. Paranasal sinuses and mastoid air  cells and middle ear cavities are clear. Calcified plaques are present  in the cavernous segments of the internal carotid arteries bilaterally  as well as the intracranial segments of the distal vertebral arteries.     Impression:    1. No acute abnormality is seen. Specifically no acute skull fracture or  intracranial hemorrhage is identified.     2. There is mild-to-moderate small vessel disease in the cerebral white  matter. There is a 13 x 10 mm old superior left frontal cortical infarct  in the left middle cerebral artery territory and an additional 16 x 14  mm old superior lateral left occipital lobe infarct in the left middle  cerebral artery territory. There is cystic area of encephalomalacia  tracking throughout the majority of the left putamen into the anterior  limb of the left internal capsule and left external capsule that  measures 3.6 x 1 x 1.8 cm and is the sequela of an old infarct or old  hemorrhage. Correlation with clinical history is suggested.     3. There is diffuse  cerebral atrophy and prominent calcified plaques in  the intracranial segments of the distal vertebral arteries and cavernous  and supracavernous segments of the internal carotid arteries  bilaterally. The remainder of the head CT is within normal limits.        CERVICAL SPINE CT      TECHNIQUE: Spiral CT images were obtained from the skull base down to  the T2 thoracic level and images were reformatted and submitted in 2 mm  thick axial, sagittal and coronal CT sections with soft tissue algorithm  and 1 mm thick axial, sagittal and coronal CT sections with  high-resolution bone algorithm.     FINDINGS: The craniocervical junction is normal in appearance. There are  arthritic changes at the atlantodental interval with marginal spurring  off the anterior ring of C1 and the odontoid process. Otherwise, the  C1-2 level is normal in appearance.     At C2-3 the disc space is normal. There is mild-to-moderate right facet  overgrowth. The left facets and uncovertebral joints are normal and  there is no canal or foraminal narrowing.     At C3-4 there is mild right and there is moderate left facet overgrowth,  minimal posterior spurring and mild bilateral uncovertebral joint  hypertrophy. There is mild canal and there is mild right and moderate  left bony foraminal narrowing.     At C4-5 there is minimal left and there is moderate right facet  overgrowth, mild posterior endplate spurring. There is mild canal and  mild-to-moderate right bony foraminal narrowing.     At C5-6 there is minimal left and there is moderate-to-severe right  facet overgrowth, mild disc space narrowing and degenerative endplate  changes. Posterior disc osteophyte complex mild to moderately narrows  the canal. There appears to be a left posterolateral to medial foraminal  disc herniation that presses on the the left C6 nerve root as it extends  from the cord toward the foramen, compresses the C6 nerve root as it  enters the left foramen at C5-6. There  is also moderate right bony  foraminal narrowing.     At C6-7 there is calcification of the disc space, some bony bridging  across the endplates that may be degenerative in origin or possibly  related to prior surgery. There is some calcification of the posterior  longitudinal ligament along the midline of the posterior inferior body  of C6 and posterior superior body of C7. There is mild-to-moderate  narrowing of the central portion of the canal, some uncovertebral joint  hypertrophy. The facets are normal. There is mild right and  mild-to-moderate left bony foraminal narrowing.     At C7-T1 there is mild right facet overgrowth. The posterior disc margin  is normal. There is no canal or foraminal narrowing.     No acute fracture seen in the cervical spine.     IMPRESSION:  No acute fracture is seen in cervical spine. There is cervical  spondylosis as described above.      The results were communicated to Dr. Elvin Trejo from the  emergency room by telephone 10/25/2019 at 10:20 AM.     Radiation dose reduction techniques were utilized, including automated  exposure control and exposure modulation based on body size.     This report was finalized on 10/25/2019 2:40 PM by Dr. Kenny Melgar M.D.       Scheduled Medications  amLODIPine, 5 mg, Oral, Daily  aspirin, 81 mg, Oral, Daily  atorvastatin, 20 mg, Oral, Nightly  clopidogrel, 75 mg, Oral, Nightly  docusate sodium, 100 mg, Oral, BID  insulin lispro, 0-9 Units, Subcutaneous, TID AC  metoprolol succinate XL, 100 mg, Oral, BID  mirtazapine, 7.5 mg, Oral, Nightly  pantoprazole, 40 mg, Oral, QAM  QUEtiapine, 25 mg, Oral, Nightly  tamsulosin, 0.4 mg, Oral, Daily    Infusions   Diet  Diet Pureed; [place] / Syrup Thick       Assessment/Plan     Active Hospital Problems    Diagnosis  POA   • **Dementia with behavioral disturbance (CMS/[facility]) [F03.91]  Yes   • Type 2 diabetes mellitus with hyperglycemia, without long-term current use of insulin (CMS/[facility]) [E11.65]  Yes    • History of CVA (cerebrovascular accident) [Z86.73]  Not Applicable   • PVD (peripheral vascular disease) (CMS/[facility]) [I73.9]  Yes   • Paroxysmal atrial fibrillation (CMS/[facility]) [I48.0]  Yes   • GERD (gastroesophageal reflux disease) [K21.9]  Yes   • HLD (hyperlipidemia) [E78.5]  Yes   • COPD (chronic obstructive pulmonary disease) (CMS/[facility]) [J44.9]  Yes      Resolved Hospital Problems   No resolved problems to display.       86 y.o. male admitted with Dementia with behavioral disturbance (CMS/[facility]).     Assessment and plan  1. Dementia with behavior disturbance, patient has underlying UTI, psychiatry has evaluated the patient. Continue antibiotics for management of UTI.  2.  Poorly-controlled diabetes mellitus, A1c is noted to be above 8, continue Accu-Cheks and sliding scale insulin coverage.  Hypoglycemia was noted, Lantus has been temporarily discontinued.  We will resume and titrate Lantus based on blood glucose trend.  3.  History of CVA, continue aspirin and statin therapy.  4. Paroxysmal atrial fibrillation, he appears to be rate controlled at this point of time.   5. Hypertension, continue Toprol-XL.  6.  Further plans based on hospital course.      Luis Miguel Braswell MD  [facility]  02/09/21  16:13 EST

## 2021-02-10 NOTE — PROGRESS NOTES
Name: Jn Beauchamp ADMIT: 2021   : 1934  PCP: Edil Gilliam MD    MRN: [id number] LOS: 4 days   AGE/SEX: 86 y.o. male  ROOM: [facility]     Subjective   Subjective    Patient seen at bedside, he is not arousable.       Objective   Objective   Vital Signs  Temp:  [96.7 °F (35.9 °C)-98.3 °F (36.8 °C)] 98.3 °F (36.8 °C)  Heart Rate:  [60-68] 60  Resp:  [16] 16  BP: (153-188)/(61-78) 153/67  SpO2:  [96 %-100 %] 97 %  on   ;   Device (Oxygen Therapy): room air  Body mass index is 27.1 kg/m².  Physical Exam   General:    Altered mental status  Head and ENT: normocephalic and atraumatic.  Lungs: symmetric expansion and equal air entry bilaterally.  Heart: regular rate, rhythm, no murmurs.  Abdomen: soft, nontender, bowel sounds present.  Extremities:  No clubbing, cyanosis or edema.  Neurologic:   unable to evaluate because of altered mental status  Psychiatry:   unable to evaluate because of altered mental status  Skin:  Warm and no rash.    Results Review     I reviewed the patient's new clinical results.  Results from last 7 days   Lab Units 21  0640 21  0521  0621  2143   WBC 10*3/mm3 7.03 7.25 8.79 9.20   HEMOGLOBIN g/dL 11.1* 11.4* 10.5* 11.1*   PLATELETS 10*3/mm3 195 240 224 234     Results from last 7 days   Lab Units 21  0640 21  0521 21  0607 21  2143   SODIUM mmol/L 137 142 145 144   POTASSIUM mmol/L 3.8 3.3* 3.4* 4.5   CHLORIDE mmol/L 103 109* 111* 109*   CO2 mmol/L 24.9 24.4 23.5 24.2   BUN mg/dL 26* 28* 32* 35*   CREATININE mg/dL 1.65* 1.66* 1.83* 2.05*   GLUCOSE mg/dL 165* 96 45* 224*   Estimated Creatinine Clearance: 35.7 mL/min (A) (by C-G formula based on SCr of 1.65 mg/dL (H)).  Results from last 7 days   Lab Units 21  2143   ALBUMIN g/dL 3.70   BILIRUBIN mg/dL <0.2   ALK PHOS U/L 108   AST (SGOT) U/L 12   ALT (SGPT) U/L 6     Results from last 7 days   Lab Units 21  0640 21  0521 21  0607 21  2143    CALCIUM mg/dL 9.4 9.2 9.5 9.5   ALBUMIN g/dL  --   --   --  3.70       COVID19   Date Value Ref Range Status   02/06/2021 Not Detected Not Detected - Ref. Range Final     Glucose   Date/Time Value Ref Range Status   02/10/2021 1128 220 (H) 70 - 130 mg/dL Final   02/10/2021 0601 213 (H) 70 - 130 mg/dL Final   02/09/2021 2017 225 (H) 70 - 130 mg/dL Final   02/09/2021 1657 202 (H) 70 - 130 mg/dL Final   02/09/2021 1151 201 (H) 70 - 130 mg/dL Final   02/09/2021 0614 176 (H) 70 - 130 mg/dL Final   02/08/2021 2021 301 (H) 70 - 130 mg/dL Final       CT Cervical Spine Without Contrast  Narrative: EMERGENCY NONCONTRAST HEAD CT AND NONCONTRAST CERVICAL SPINE CT  10/25/2019     CLINICAL HISTORY: Patient fell, hit head, abrasion to right occipital  region, has headache and neck pain.     HEAD CT     TECHNIQUE: Spiral CT images were obtained from the base of the skull  through the vertex without intravenous contrast. Images were reformatted  and are submitted in 3 mm thick axial CT sections were brain algorithm.  2 mm thick axial CT sections with high-resolution bone algorithm and 2  mm thick sagittal and coronal reconstructions were performed and  submitted in brain algorithm.     There are no prior head CTs for comparison.     FINDINGS: There are patchy areas of low-density extending from  periventricular into the subcortical white matter of the cerebral  hemispheres consistent with mild-to-moderate small vessel disease. There  is a focal area of encephalomalacia in the superior left frontal gyrus  compatible with an old superior left frontal lobe infarct measuring 13 x  10 mm in size in the distribution of the superior frontal branches of  left middle cerebral artery territory and there is an additional 16 x 14  x 12 mm area of encephalomalacia in the superior lateral left occipital  lobe in the distribution of the superior lateral occipital branches of  the inferior division left middle cerebral artery territory. There is  a  cystic area of encephalomalacia tracking throughout the left putamen,  some extension into the anterior limb of the left internal capsule and  the left external capsule and the area measures 3.6 x 1 x 1.9 cm in  anteroposterior, mediolateral and craniocaudal dimension compatible with  sequelae of an old infarct or hemorrhage at this site. There is diffuse  cerebral atrophy via volume loss in the left side. The left lateral  ventricle is larger than the right and ventricles are mildly prominent  in size felt to be due to central volume loss or atrophy. I see no mass  effect, no midline shift and no extra-axial fluid collections are  identified. There is no evidence of acute intracranial hemorrhage. No  acute skull fracture is identified. Paranasal sinuses and mastoid air  cells and middle ear cavities are clear. Calcified plaques are present  in the cavernous segments of the internal carotid arteries bilaterally  as well as the intracranial segments of the distal vertebral arteries.     Impression:    1. No acute abnormality is seen. Specifically no acute skull fracture or  intracranial hemorrhage is identified.     2. There is mild-to-moderate small vessel disease in the cerebral white  matter. There is a 13 x 10 mm old superior left frontal cortical infarct  in the left middle cerebral artery territory and an additional 16 x 14  mm old superior lateral left occipital lobe infarct in the left middle  cerebral artery territory. There is cystic area of encephalomalacia  tracking throughout the majority of the left putamen into the anterior  limb of the left internal capsule and left external capsule that  measures 3.6 x 1 x 1.8 cm and is the sequela of an old infarct or old  hemorrhage. Correlation with clinical history is suggested.     3. There is diffuse cerebral atrophy and prominent calcified plaques in  the intracranial segments of the distal vertebral arteries and cavernous  and supracavernous segments of  the internal carotid arteries  bilaterally. The remainder of the head CT is within normal limits.        CERVICAL SPINE CT      TECHNIQUE: Spiral CT images were obtained from the skull base down to  the T2 thoracic level and images were reformatted and submitted in 2 mm  thick axial, sagittal and coronal CT sections with soft tissue algorithm  and 1 mm thick axial, sagittal and coronal CT sections with  high-resolution bone algorithm.     FINDINGS: The craniocervical junction is normal in appearance. There are  arthritic changes at the atlantodental interval with marginal spurring  off the anterior ring of C1 and the odontoid process. Otherwise, the  C1-2 level is normal in appearance.     At C2-3 the disc space is normal. There is mild-to-moderate right facet  overgrowth. The left facets and uncovertebral joints are normal and  there is no canal or foraminal narrowing.     At C3-4 there is mild right and there is moderate left facet overgrowth,  minimal posterior spurring and mild bilateral uncovertebral joint  hypertrophy. There is mild canal and there is mild right and moderate  left bony foraminal narrowing.     At C4-5 there is minimal left and there is moderate right facet  overgrowth, mild posterior endplate spurring. There is mild canal and  mild-to-moderate right bony foraminal narrowing.     At C5-6 there is minimal left and there is moderate-to-severe right  facet overgrowth, mild disc space narrowing and degenerative endplate  changes. Posterior disc osteophyte complex mild to moderately narrows  the canal. There appears to be a left posterolateral to medial foraminal  disc herniation that presses on the the left C6 nerve root as it extends  from the cord toward the foramen, compresses the C6 nerve root as it  enters the left foramen at C5-6. There is also moderate right bony  foraminal narrowing.     At C6-7 there is calcification of the disc space, some bony bridging  across the endplates that may be  degenerative in origin or possibly  related to prior surgery. There is some calcification of the posterior  longitudinal ligament along the midline of the posterior inferior body  of C6 and posterior superior body of C7. There is mild-to-moderate  narrowing of the central portion of the canal, some uncovertebral joint  hypertrophy. The facets are normal. There is mild right and  mild-to-moderate left bony foraminal narrowing.     At C7-T1 there is mild right facet overgrowth. The posterior disc margin  is normal. There is no canal or foraminal narrowing.     No acute fracture seen in the cervical spine.     IMPRESSION:  No acute fracture is seen in cervical spine. There is cervical  spondylosis as described above.      The results were communicated to Dr. Elvin Trejo from the  emergency room by telephone 10/25/2019 at 10:20 AM.     Radiation dose reduction techniques were utilized, including automated  exposure control and exposure modulation based on body size.     This report was finalized on 10/25/2019 2:40 PM by Dr. Kenny Melgar M.D.     CT Head Without Contrast  Narrative: EMERGENCY NONCONTRAST HEAD CT AND NONCONTRAST CERVICAL SPINE CT  10/25/2019     CLINICAL HISTORY: Patient fell, hit head, abrasion to right occipital  region, has headache and neck pain.     HEAD CT     TECHNIQUE: Spiral CT images were obtained from the base of the skull  through the vertex without intravenous contrast. Images were reformatted  and are submitted in 3 mm thick axial CT sections were brain algorithm.  2 mm thick axial CT sections with high-resolution bone algorithm and 2  mm thick sagittal and coronal reconstructions were performed and  submitted in brain algorithm.     There are no prior head CTs for comparison.     FINDINGS: There are patchy areas of low-density extending from  periventricular into the subcortical white matter of the cerebral  hemispheres consistent with mild-to-moderate small vessel disease.  There  is a focal area of encephalomalacia in the superior left frontal gyrus  compatible with an old superior left frontal lobe infarct measuring 13 x  10 mm in size in the distribution of the superior frontal branches of  left middle cerebral artery territory and there is an additional 16 x 14  x 12 mm area of encephalomalacia in the superior lateral left occipital  lobe in the distribution of the superior lateral occipital branches of  the inferior division left middle cerebral artery territory. There is a  cystic area of encephalomalacia tracking throughout the left putamen,  some extension into the anterior limb of the left internal capsule and  the left external capsule and the area measures 3.6 x 1 x 1.9 cm in  anteroposterior, mediolateral and craniocaudal dimension compatible with  sequelae of an old infarct or hemorrhage at this site. There is diffuse  cerebral atrophy via volume loss in the left side. The left lateral  ventricle is larger than the right and ventricles are mildly prominent  in size felt to be due to central volume loss or atrophy. I see no mass  effect, no midline shift and no extra-axial fluid collections are  identified. There is no evidence of acute intracranial hemorrhage. No  acute skull fracture is identified. Paranasal sinuses and mastoid air  cells and middle ear cavities are clear. Calcified plaques are present  in the cavernous segments of the internal carotid arteries bilaterally  as well as the intracranial segments of the distal vertebral arteries.     Impression:    1. No acute abnormality is seen. Specifically no acute skull fracture or  intracranial hemorrhage is identified.     2. There is mild-to-moderate small vessel disease in the cerebral white  matter. There is a 13 x 10 mm old superior left frontal cortical infarct  in the left middle cerebral artery territory and an additional 16 x 14  mm old superior lateral left occipital lobe infarct in the left middle  cerebral  artery territory. There is cystic area of encephalomalacia  tracking throughout the majority of the left putamen into the anterior  limb of the left internal capsule and left external capsule that  measures 3.6 x 1 x 1.8 cm and is the sequela of an old infarct or old  hemorrhage. Correlation with clinical history is suggested.     3. There is diffuse cerebral atrophy and prominent calcified plaques in  the intracranial segments of the distal vertebral arteries and cavernous  and supracavernous segments of the internal carotid arteries  bilaterally. The remainder of the head CT is within normal limits.        CERVICAL SPINE CT      TECHNIQUE: Spiral CT images were obtained from the skull base down to  the T2 thoracic level and images were reformatted and submitted in 2 mm  thick axial, sagittal and coronal CT sections with soft tissue algorithm  and 1 mm thick axial, sagittal and coronal CT sections with  high-resolution bone algorithm.     FINDINGS: The craniocervical junction is normal in appearance. There are  arthritic changes at the atlantodental interval with marginal spurring  off the anterior ring of C1 and the odontoid process. Otherwise, the  C1-2 level is normal in appearance.     At C2-3 the disc space is normal. There is mild-to-moderate right facet  overgrowth. The left facets and uncovertebral joints are normal and  there is no canal or foraminal narrowing.     At C3-4 there is mild right and there is moderate left facet overgrowth,  minimal posterior spurring and mild bilateral uncovertebral joint  hypertrophy. There is mild canal and there is mild right and moderate  left bony foraminal narrowing.     At C4-5 there is minimal left and there is moderate right facet  overgrowth, mild posterior endplate spurring. There is mild canal and  mild-to-moderate right bony foraminal narrowing.     At C5-6 there is minimal left and there is moderate-to-severe right  facet overgrowth, mild disc space narrowing  and degenerative endplate  changes. Posterior disc osteophyte complex mild to moderately narrows  the canal. There appears to be a left posterolateral to medial foraminal  disc herniation that presses on the the left C6 nerve root as it extends  from the cord toward the foramen, compresses the C6 nerve root as it  enters the left foramen at C5-6. There is also moderate right bony  foraminal narrowing.     At C6-7 there is calcification of the disc space, some bony bridging  across the endplates that may be degenerative in origin or possibly  related to prior surgery. There is some calcification of the posterior  longitudinal ligament along the midline of the posterior inferior body  of C6 and posterior superior body of C7. There is mild-to-moderate  narrowing of the central portion of the canal, some uncovertebral joint  hypertrophy. The facets are normal. There is mild right and  mild-to-moderate left bony foraminal narrowing.     At C7-T1 there is mild right facet overgrowth. The posterior disc margin  is normal. There is no canal or foraminal narrowing.     No acute fracture seen in the cervical spine.     IMPRESSION:  No acute fracture is seen in cervical spine. There is cervical  spondylosis as described above.      The results were communicated to Dr. Elvin Trejo from the  emergency room by telephone 10/25/2019 at 10:20 AM.     Radiation dose reduction techniques were utilized, including automated  exposure control and exposure modulation based on body size.     This report was finalized on 10/25/2019 2:40 PM by Dr. Kenny Melgar M.D.       Scheduled Medications  amLODIPine, 5 mg, Oral, Daily  aspirin, 81 mg, Oral, Daily  atorvastatin, 20 mg, Oral, Nightly  clopidogrel, 75 mg, Oral, Nightly  docusate sodium, 100 mg, Oral, BID  insulin lispro, 0-9 Units, Subcutaneous, TID AC  metoprolol succinate XL, 100 mg, Oral, BID  mirtazapine, 7.5 mg, Oral, Nightly  pantoprazole, 40 mg, Oral, QAM  QUEtiapine, 25 mg,  Oral, Nightly  tamsulosin, 0.4 mg, Oral, Daily    Infusions   Diet  Diet Pureed; [place] / Syrup Thick       Assessment/Plan     Active Hospital Problems    Diagnosis  POA   • **Dementia with behavioral disturbance (CMS/[facility]) [F03.91]  Yes   • Type 2 diabetes mellitus with hyperglycemia, without long-term current use of insulin (CMS/[facility]) [E11.65]  Yes   • History of CVA (cerebrovascular accident) [Z86.73]  Not Applicable   • PVD (peripheral vascular disease) (CMS/[facility]) [I73.9]  Yes   • Paroxysmal atrial fibrillation (CMS/[facility]) [I48.0]  Yes   • GERD (gastroesophageal reflux disease) [K21.9]  Yes   • HLD (hyperlipidemia) [E78.5]  Yes   • COPD (chronic obstructive pulmonary disease) (CMS/[facility]) [J44.9]  Yes      Resolved Hospital Problems   No resolved problems to display.       86 y.o. male admitted with Dementia with behavioral disturbance (CMS/[facility]).     Assessment and plan  1. Dementia with behavior disturbance, patient has underlying UTI, yeast isolated from cultures, will stop antibiotics and monitor.  2.  Poorly-controlled diabetes mellitus, A1c is noted to be above 8, continue Accu-Cheks and sliding scale insulin coverage.  Hypoglycemia was noted, Lantus has been temporarily discontinued.  We will resume and titrate Lantus based on blood glucose trend.  3.  History of CVA, continue aspirin and statin therapy.  4. Paroxysmal atrial fibrillation, he appears to be rate controlled at this point of time.   5. Hypertension, continue Toprol-XL.  6.  Further plans based on hospital course.      Luis Miguel Braswell MD  [facility]  02/10/21  16:16 EST

## 2021-02-11 LAB
ANION GAP SERPL CALCULATED.3IONS-SCNC: 10.1 MMOL/L (ref 5–15)
BASOPHILS # BLD AUTO: 0.06 10*3/MM3 (ref 0–0.2)
BASOPHILS NFR BLD AUTO: 0.9 % (ref 0–1.5)
BUN SERPL-MCNC: 25 MG/DL (ref 8–23)
BUN/CREAT SERPL: 17.1 (ref 7–25)
CALCIUM SPEC-SCNC: 9.4 MG/DL (ref 8.6–10.5)
CHLORIDE SERPL-SCNC: 106 MMOL/L (ref 98–107)
CO2 SERPL-SCNC: 23.9 MMOL/L (ref 22–29)
CREAT SERPL-MCNC: 1.46 MG/DL (ref 0.76–1.27)
DEPRECATED RDW RBC AUTO: 43.9 FL (ref 37–54)
EOSINOPHIL # BLD AUTO: 0.54 10*3/MM3 (ref 0–0.4)
EOSINOPHIL NFR BLD AUTO: 7.8 % (ref 0.3–6.2)
ERYTHROCYTE [DISTWIDTH] IN BLOOD BY AUTOMATED COUNT: 14.3 % (ref 12.3–15.4)
GFR SERPL CREATININE-BSD FRML MDRD: 46 ML/MIN/1.73
GLUCOSE BLDC GLUCOMTR-MCNC: 153 MG/DL (ref 70–130)
GLUCOSE BLDC GLUCOMTR-MCNC: 159 MG/DL (ref 70–130)
GLUCOSE BLDC GLUCOMTR-MCNC: 180 MG/DL (ref 70–130)
GLUCOSE BLDC GLUCOMTR-MCNC: 270 MG/DL (ref 70–130)
GLUCOSE SERPL-MCNC: 248 MG/DL (ref 65–99)
HCT VFR BLD AUTO: 35.1 % (ref 37.5–51)
HGB BLD-MCNC: 11.1 G/DL (ref 13–17.7)
IMM GRANULOCYTES # BLD AUTO: 0.01 10*3/MM3 (ref 0–0.05)
IMM GRANULOCYTES NFR BLD AUTO: 0.1 % (ref 0–0.5)
LYMPHOCYTES # BLD AUTO: 1.68 10*3/MM3 (ref 0.7–3.1)
LYMPHOCYTES NFR BLD AUTO: 24.4 % (ref 19.6–45.3)
MCH RBC QN AUTO: 26.9 PG (ref 26.6–33)
MCHC RBC AUTO-ENTMCNC: 31.6 G/DL (ref 31.5–35.7)
MCV RBC AUTO: 85.2 FL (ref 79–97)
MONOCYTES # BLD AUTO: 0.44 10*3/MM3 (ref 0.1–0.9)
MONOCYTES NFR BLD AUTO: 6.4 % (ref 5–12)
NEUTROPHILS NFR BLD AUTO: 4.15 10*3/MM3 (ref 1.7–7)
NEUTROPHILS NFR BLD AUTO: 60.4 % (ref 42.7–76)
NRBC BLD AUTO-RTO: 0 /100 WBC (ref 0–0.2)
PLATELET # BLD AUTO: 220 10*3/MM3 (ref 140–450)
PMV BLD AUTO: 11.5 FL (ref 6–12)
POTASSIUM SERPL-SCNC: 3.7 MMOL/L (ref 3.5–5.2)
RBC # BLD AUTO: 4.12 10*6/MM3 (ref 4.14–5.8)
SODIUM SERPL-SCNC: 140 MMOL/L (ref 136–145)
WBC # BLD AUTO: 6.88 10*3/MM3 (ref 3.4–10.8)

## 2021-02-11 PROCEDURE — 85025 COMPLETE CBC W/AUTO DIFF WBC: CPT | Performed by: INTERNAL MEDICINE

## 2021-02-11 PROCEDURE — 80048 BASIC METABOLIC PNL TOTAL CA: CPT | Performed by: INTERNAL MEDICINE

## 2021-02-11 PROCEDURE — 63710000001 INSULIN LISPRO (HUMAN) PER 5 UNITS: Performed by: NURSE PRACTITIONER

## 2021-02-11 PROCEDURE — 82962 GLUCOSE BLOOD TEST: CPT

## 2021-02-11 RX ADMIN — QUETIAPINE FUMARATE 25 MG: 25 TABLET ORAL at 21:28

## 2021-02-11 RX ADMIN — INSULIN LISPRO 2 UNITS: 100 INJECTION, SOLUTION INTRAVENOUS; SUBCUTANEOUS at 18:18

## 2021-02-11 RX ADMIN — DOCUSATE SODIUM 100 MG: 100 CAPSULE, LIQUID FILLED ORAL at 21:27

## 2021-02-11 RX ADMIN — METOPROLOL SUCCINATE 100 MG: 100 TABLET, EXTENDED RELEASE ORAL at 21:28

## 2021-02-11 RX ADMIN — INSULIN LISPRO 2 UNITS: 100 INJECTION, SOLUTION INTRAVENOUS; SUBCUTANEOUS at 12:08

## 2021-02-11 RX ADMIN — MIRTAZAPINE 7.5 MG: 15 TABLET, FILM COATED ORAL at 21:27

## 2021-02-11 RX ADMIN — CLOPIDOGREL 75 MG: 75 TABLET, FILM COATED ORAL at 21:27

## 2021-02-11 RX ADMIN — ATORVASTATIN CALCIUM 20 MG: 20 TABLET, FILM COATED ORAL at 21:27

## 2021-02-11 RX ADMIN — INSULIN LISPRO 6 UNITS: 100 INJECTION, SOLUTION INTRAVENOUS; SUBCUTANEOUS at 08:13

## 2021-02-11 NOTE — PROGRESS NOTES
Discharge Planning Assessment  [facility]     Patient Name: Jn Beauchamp  MRN: [id number]  Today's Date: 2/11/2021    Admit Date: 2/6/2021    Discharge Needs Assessment    No documentation.       Discharge Plan     Row Name 02/11/21 1652       Plan    Plan Comments  [facility]/EMS set up for 13:00 tomorrow. Notified RN/Vishnu and MD/French of the time. LYNN Crowley RN, CCP    Row Name 02/11/21 5334       Plan    Plan Comments  Spoke with [place]/daughter and she is agreeable to discharge plans for her father. The discharge plans is for the patient to return to [facility], LTC medicaid bed with [facility] to follow. [facility] to provide the transportation tomorrow. Left message with [facility] ambulance to set up time for tomorrow. Discharge packet started and it is with the chart. Per [place] he will not require a COVID test to return. LYNN Crowley RN, CCP        Continued Care and Services - Admitted Since 2/6/2021     Destination Coordination complete    Service Provider Request Status Selected Services Address Phone Fax Patient Preferred    [facility]   [facility] [address] [place] [id number] [phone] --                Demographic Summary    No documentation.       Functional Status    No documentation.       Psychosocial    No documentation.       Abuse/Neglect    No documentation.       Legal    No documentation.       Substance Abuse    No documentation.       Patient Forms    No documentation.           Hillary Crowley RN

## 2021-02-11 NOTE — PROGRESS NOTES
Continued Stay Note  [facility]     Patient Name: Jn Beauchamp  MRN: [id number]  Today's Date: 2/11/2021    Admit Date: 2/6/2021    Discharge Plan     Row Name 02/11/21 1547       Plan    Plan Comments  Spoke with Oksana/daughter and she is agreeable to discharge plans for her father. The discharge plans is for the patient to return to [facility], LTC medicaid bed with [facility] to follow. [facility] to provide the transportation tomorrow. Left message with [facility] ambulance to set up time for tomorrow. Discharge packet started and it is with the chart. Per [place] he will not require a COVID test to return. LYNN Crowley RN, CCP        Discharge Codes    No documentation.             Hillary Crowley RN

## 2021-02-11 NOTE — PLAN OF CARE
Goal Outcome Evaluation:  Plan of Care Reviewed With: patient  Progress: no change  Outcome Summary: Patient alert /confused / with episodes of increased episodes of behavior disturb noncomplaint / combative / easly redirected / rested well during night tour / safety precation in place / pending discharge /

## 2021-02-11 NOTE — PROGRESS NOTES
Name: Jn Beauchamp ADMIT: 2021   : 1934  PCP: Edil Gilliam MD    MRN: [id number] LOS: 5 days   AGE/SEX: 86 y.o. male  ROOM: [facility][note 3]     Subjective   Subjective     Patient continues to remain lethargic, lying in bed.          Objective   Objective   Vital Signs  Temp:  [97.6 °F (36.4 °C)-99 °F (37.2 °C)] 99 °F (37.2 °C)  Heart Rate:  [] 57  Resp:  [16] 16  BP: ()/(71-72) 168/71  SpO2:  [92 %-100 %] 92 %  on   ;   Device (Oxygen Therapy): room air  Body mass index is 27.1 kg/m².  Physical Exam  General:    Altered mental status  Head and ENT: normocephalic and atraumatic.  Lungs: symmetric expansion and equal air entry bilaterally.  Heart: regular rate, rhythm, no murmurs.  Abdomen: soft, nontender, bowel sounds present.  Extremities:  No clubbing, cyanosis or edema.  Neurologic:   unable to evaluate because of altered mental status  Psychiatry:   unable to evaluate because of altered mental status  Skin:  Warm and no rash.    Results Review     I reviewed the patient's new clinical results.  Results from last 7 days   Lab Units 21  0821  0640 21  0607   WBC 10*3/mm3 6.88 7.03 7.25 8.79   HEMOGLOBIN g/dL 11.1* 11.1* 11.4* 10.5*   PLATELETS 10*3/mm3 220 195 240 224     Results from last 7 days   Lab Units 21  0821  0640 21  0521  0607   SODIUM mmol/L 140 137 142 145   POTASSIUM mmol/L 3.7 3.8 3.3* 3.4*   CHLORIDE mmol/L 106 103 109* 111*   CO2 mmol/L 23.9 24.9 24.4 23.5   BUN mg/dL 25* 26* 28* 32*   CREATININE mg/dL 1.46* 1.65* 1.66* 1.83*   GLUCOSE mg/dL 248* 165* 96 45*   Estimated Creatinine Clearance: 40.3 mL/min (A) (by C-G formula based on SCr of 1.46 mg/dL (H)).  Results from last 7 days   Lab Units 21  2143   ALBUMIN g/dL 3.70   BILIRUBIN mg/dL <0.2   ALK PHOS U/L 108   AST (SGOT) U/L 12   ALT (SGPT) U/L 6     Results from last 7 days   Lab Units 21  0801 21  0640 21  0521  02/07/21  0607 02/06/21  2143   CALCIUM mg/dL 9.4 9.4 9.2 9.5 9.5   ALBUMIN g/dL  --   --   --   --  3.70       COVID19   Date Value Ref Range Status   02/06/2021 Not Detected Not Detected - Ref. Range Final     Glucose   Date/Time Value Ref Range Status   02/11/2021 1122 159 (H) 70 - 130 mg/dL Final   02/11/2021 0635 270 (H) 70 - 130 mg/dL Final   02/10/2021 2351 264 (H) 70 - 130 mg/dL Final   02/10/2021 1634 219 (H) 70 - 130 mg/dL Final   02/10/2021 1128 220 (H) 70 - 130 mg/dL Final   02/10/2021 0601 213 (H) 70 - 130 mg/dL Final   02/09/2021 2017 225 (H) 70 - 130 mg/dL Final       CT Cervical Spine Without Contrast  Narrative: EMERGENCY NONCONTRAST HEAD CT AND NONCONTRAST CERVICAL SPINE CT  10/25/2019     CLINICAL HISTORY: Patient fell, hit head, abrasion to right occipital  region, has headache and neck pain.     HEAD CT     TECHNIQUE: Spiral CT images were obtained from the base of the skull  through the vertex without intravenous contrast. Images were reformatted  and are submitted in 3 mm thick axial CT sections were brain algorithm.  2 mm thick axial CT sections with high-resolution bone algorithm and 2  mm thick sagittal and coronal reconstructions were performed and  submitted in brain algorithm.     There are no prior head CTs for comparison.     FINDINGS: There are patchy areas of low-density extending from  periventricular into the subcortical white matter of the cerebral  hemispheres consistent with mild-to-moderate small vessel disease. There  is a focal area of encephalomalacia in the superior left frontal gyrus  compatible with an old superior left frontal lobe infarct measuring 13 x  10 mm in size in the distribution of the superior frontal branches of  left middle cerebral artery territory and there is an additional 16 x 14  x 12 mm area of encephalomalacia in the superior lateral left occipital  lobe in the distribution of the superior lateral occipital branches of  the inferior division left  middle cerebral artery territory. There is a  cystic area of encephalomalacia tracking throughout the left putamen,  some extension into the anterior limb of the left internal capsule and  the left external capsule and the area measures 3.6 x 1 x 1.9 cm in  anteroposterior, mediolateral and craniocaudal dimension compatible with  sequelae of an old infarct or hemorrhage at this site. There is diffuse  cerebral atrophy via volume loss in the left side. The left lateral  ventricle is larger than the right and ventricles are mildly prominent  in size felt to be due to central volume loss or atrophy. I see no mass  effect, no midline shift and no extra-axial fluid collections are  identified. There is no evidence of acute intracranial hemorrhage. No  acute skull fracture is identified. Paranasal sinuses and mastoid air  cells and middle ear cavities are clear. Calcified plaques are present  in the cavernous segments of the internal carotid arteries bilaterally  as well as the intracranial segments of the distal vertebral arteries.     Impression:    1. No acute abnormality is seen. Specifically no acute skull fracture or  intracranial hemorrhage is identified.     2. There is mild-to-moderate small vessel disease in the cerebral white  matter. There is a 13 x 10 mm old superior left frontal cortical infarct  in the left middle cerebral artery territory and an additional 16 x 14  mm old superior lateral left occipital lobe infarct in the left middle  cerebral artery territory. There is cystic area of encephalomalacia  tracking throughout the majority of the left putamen into the anterior  limb of the left internal capsule and left external capsule that  measures 3.6 x 1 x 1.8 cm and is the sequela of an old infarct or old  hemorrhage. Correlation with clinical history is suggested.     3. There is diffuse cerebral atrophy and prominent calcified plaques in  the intracranial segments of the distal vertebral arteries and  cavernous  and supracavernous segments of the internal carotid arteries  bilaterally. The remainder of the head CT is within normal limits.        CERVICAL SPINE CT      TECHNIQUE: Spiral CT images were obtained from the skull base down to  the T2 thoracic level and images were reformatted and submitted in 2 mm  thick axial, sagittal and coronal CT sections with soft tissue algorithm  and 1 mm thick axial, sagittal and coronal CT sections with  high-resolution bone algorithm.     FINDINGS: The craniocervical junction is normal in appearance. There are  arthritic changes at the atlantodental interval with marginal spurring  off the anterior ring of C1 and the odontoid process. Otherwise, the  C1-2 level is normal in appearance.     At C2-3 the disc space is normal. There is mild-to-moderate right facet  overgrowth. The left facets and uncovertebral joints are normal and  there is no canal or foraminal narrowing.     At C3-4 there is mild right and there is moderate left facet overgrowth,  minimal posterior spurring and mild bilateral uncovertebral joint  hypertrophy. There is mild canal and there is mild right and moderate  left bony foraminal narrowing.     At C4-5 there is minimal left and there is moderate right facet  overgrowth, mild posterior endplate spurring. There is mild canal and  mild-to-moderate right bony foraminal narrowing.     At C5-6 there is minimal left and there is moderate-to-severe right  facet overgrowth, mild disc space narrowing and degenerative endplate  changes. Posterior disc osteophyte complex mild to moderately narrows  the canal. There appears to be a left posterolateral to medial foraminal  disc herniation that presses on the the left C6 nerve root as it extends  from the cord toward the foramen, compresses the C6 nerve root as it  enters the left foramen at C5-6. There is also moderate right bony  foraminal narrowing.     At C6-7 there is calcification of the disc space, some bony  bridging  across the endplates that may be degenerative in origin or possibly  related to prior surgery. There is some calcification of the posterior  longitudinal ligament along the midline of the posterior inferior body  of C6 and posterior superior body of C7. There is mild-to-moderate  narrowing of the central portion of the canal, some uncovertebral joint  hypertrophy. The facets are normal. There is mild right and  mild-to-moderate left bony foraminal narrowing.     At C7-T1 there is mild right facet overgrowth. The posterior disc margin  is normal. There is no canal or foraminal narrowing.     No acute fracture seen in the cervical spine.     IMPRESSION:  No acute fracture is seen in cervical spine. There is cervical  spondylosis as described above.      The results were communicated to Dr. Elvin Trejo from the  emergency room by telephone 10/25/2019 at 10:20 AM.     Radiation dose reduction techniques were utilized, including automated  exposure control and exposure modulation based on body size.     This report was finalized on 10/25/2019 2:40 PM by Dr. Kenny Melgar M.D.     CT Head Without Contrast  Narrative: EMERGENCY NONCONTRAST HEAD CT AND NONCONTRAST CERVICAL SPINE CT  10/25/2019     CLINICAL HISTORY: Patient fell, hit head, abrasion to right occipital  region, has headache and neck pain.     HEAD CT     TECHNIQUE: Spiral CT images were obtained from the base of the skull  through the vertex without intravenous contrast. Images were reformatted  and are submitted in 3 mm thick axial CT sections were brain algorithm.  2 mm thick axial CT sections with high-resolution bone algorithm and 2  mm thick sagittal and coronal reconstructions were performed and  submitted in brain algorithm.     There are no prior head CTs for comparison.     FINDINGS: There are patchy areas of low-density extending from  periventricular into the subcortical white matter of the cerebral  hemispheres consistent with  mild-to-moderate small vessel disease. There  is a focal area of encephalomalacia in the superior left frontal gyrus  compatible with an old superior left frontal lobe infarct measuring 13 x  10 mm in size in the distribution of the superior frontal branches of  left middle cerebral artery territory and there is an additional 16 x 14  x 12 mm area of encephalomalacia in the superior lateral left occipital  lobe in the distribution of the superior lateral occipital branches of  the inferior division left middle cerebral artery territory. There is a  cystic area of encephalomalacia tracking throughout the left putamen,  some extension into the anterior limb of the left internal capsule and  the left external capsule and the area measures 3.6 x 1 x 1.9 cm in  anteroposterior, mediolateral and craniocaudal dimension compatible with  sequelae of an old infarct or hemorrhage at this site. There is diffuse  cerebral atrophy via volume loss in the left side. The left lateral  ventricle is larger than the right and ventricles are mildly prominent  in size felt to be due to central volume loss or atrophy. I see no mass  effect, no midline shift and no extra-axial fluid collections are  identified. There is no evidence of acute intracranial hemorrhage. No  acute skull fracture is identified. Paranasal sinuses and mastoid air  cells and middle ear cavities are clear. Calcified plaques are present  in the cavernous segments of the internal carotid arteries bilaterally  as well as the intracranial segments of the distal vertebral arteries.     Impression:    1. No acute abnormality is seen. Specifically no acute skull fracture or  intracranial hemorrhage is identified.     2. There is mild-to-moderate small vessel disease in the cerebral white  matter. There is a 13 x 10 mm old superior left frontal cortical infarct  in the left middle cerebral artery territory and an additional 16 x 14  mm old superior lateral left occipital  lobe infarct in the left middle  cerebral artery territory. There is cystic area of encephalomalacia  tracking throughout the majority of the left putamen into the anterior  limb of the left internal capsule and left external capsule that  measures 3.6 x 1 x 1.8 cm and is the sequela of an old infarct or old  hemorrhage. Correlation with clinical history is suggested.     3. There is diffuse cerebral atrophy and prominent calcified plaques in  the intracranial segments of the distal vertebral arteries and cavernous  and supracavernous segments of the internal carotid arteries  bilaterally. The remainder of the head CT is within normal limits.        CERVICAL SPINE CT      TECHNIQUE: Spiral CT images were obtained from the skull base down to  the T2 thoracic level and images were reformatted and submitted in 2 mm  thick axial, sagittal and coronal CT sections with soft tissue algorithm  and 1 mm thick axial, sagittal and coronal CT sections with  high-resolution bone algorithm.     FINDINGS: The craniocervical junction is normal in appearance. There are  arthritic changes at the atlantodental interval with marginal spurring  off the anterior ring of C1 and the odontoid process. Otherwise, the  C1-2 level is normal in appearance.     At C2-3 the disc space is normal. There is mild-to-moderate right facet  overgrowth. The left facets and uncovertebral joints are normal and  there is no canal or foraminal narrowing.     At C3-4 there is mild right and there is moderate left facet overgrowth,  minimal posterior spurring and mild bilateral uncovertebral joint  hypertrophy. There is mild canal and there is mild right and moderate  left bony foraminal narrowing.     At C4-5 there is minimal left and there is moderate right facet  overgrowth, mild posterior endplate spurring. There is mild canal and  mild-to-moderate right bony foraminal narrowing.     At C5-6 there is minimal left and there is moderate-to-severe  right  facet overgrowth, mild disc space narrowing and degenerative endplate  changes. Posterior disc osteophyte complex mild to moderately narrows  the canal. There appears to be a left posterolateral to medial foraminal  disc herniation that presses on the the left C6 nerve root as it extends  from the cord toward the foramen, compresses the C6 nerve root as it  enters the left foramen at C5-6. There is also moderate right bony  foraminal narrowing.     At C6-7 there is calcification of the disc space, some bony bridging  across the endplates that may be degenerative in origin or possibly  related to prior surgery. There is some calcification of the posterior  longitudinal ligament along the midline of the posterior inferior body  of C6 and posterior superior body of C7. There is mild-to-moderate  narrowing of the central portion of the canal, some uncovertebral joint  hypertrophy. The facets are normal. There is mild right and  mild-to-moderate left bony foraminal narrowing.     At C7-T1 there is mild right facet overgrowth. The posterior disc margin  is normal. There is no canal or foraminal narrowing.     No acute fracture seen in the cervical spine.     IMPRESSION:  No acute fracture is seen in cervical spine. There is cervical  spondylosis as described above.      The results were communicated to Dr. Elvin Trejo from the  emergency room by telephone 10/25/2019 at 10:20 AM.     Radiation dose reduction techniques were utilized, including automated  exposure control and exposure modulation based on body size.     This report was finalized on 10/25/2019 2:40 PM by Dr. Kenny Melgar M.D.       Scheduled Medications  amLODIPine, 5 mg, Oral, Daily  aspirin, 81 mg, Oral, Daily  atorvastatin, 20 mg, Oral, Nightly  clopidogrel, 75 mg, Oral, Nightly  docusate sodium, 100 mg, Oral, BID  insulin lispro, 0-9 Units, Subcutaneous, TID AC  metoprolol succinate XL, 100 mg, Oral, BID  mirtazapine, 7.5 mg, Oral,  Nightly  pantoprazole, 40 mg, Oral, QAM  QUEtiapine, 25 mg, Oral, Nightly  tamsulosin, 0.4 mg, Oral, Daily    Infusions   Diet  Diet Pureed; Hardin / Syrup Thick       Assessment/Plan     Active Hospital Problems    Diagnosis  POA   • **Dementia with behavioral disturbance (CMS/[facility]) [F03.91]  Yes   • Type 2 diabetes mellitus with hyperglycemia, without long-term current use of insulin (CMS/[facility]) [E11.65]  Yes   • History of CVA (cerebrovascular accident) [Z86.73]  Not Applicable   • PVD (peripheral vascular disease) (CMS/[facility]) [I73.9]  Yes   • Paroxysmal atrial fibrillation (CMS/[facility]) [I48.0]  Yes   • GERD (gastroesophageal reflux disease) [K21.9]  Yes   • HLD (hyperlipidemia) [E78.5]  Yes   • COPD (chronic obstructive pulmonary disease) (CMS/[facility]) [J44.9]  Yes      Resolved Hospital Problems   No resolved problems to display.       86 y.o. male admitted with Dementia with behavioral disturbance (CMS/[facility]).     Assessment and plan  1. Dementia with behavior disturbance, patient has underlying UTI, yeast isolated from cultures, monitor without antibiotics.  2.  Poorly-controlled diabetes mellitus, A1c is noted to be above 8, continue Accu-Cheks and sliding scale insulin coverage.  Hypoglycemia was noted, Lantus has been temporarily discontinued.  We will resume and titrate Lantus based on blood glucose trend.  3.  History of CVA, continue aspirin and statin therapy.  4. Paroxysmal atrial fibrillation, he appears to be rate controlled at this point of time.   5. Hypertension, continue Toprol-XL.  6.  Further plans based on hospital course.  Plan to discharge in morning with hospice.      Luis Miguel Braswell MD  [facility]  02/11/21  16:33 EST

## 2021-02-11 NOTE — CONSULTS
Patient is eligible for hospice services at [facility]. Spoke to Nemo and she is agreeable to services at [facility]. [facility] to follow up at facility after discharge.    Spoke to Oksana [facility] about code status and she said patient is a DNR. Patient is  and has no spiritual concerns.     Thank you for the referral.    Sherly Mistry RN  [facility]  [phone]

## 2021-02-12 VITALS
HEART RATE: 71 BPM | BODY MASS INDEX: 27.15 KG/M2 | HEIGHT: 67 IN | OXYGEN SATURATION: 97 % | WEIGHT: 173 LBS | TEMPERATURE: 98.4 F | RESPIRATION RATE: 16 BRPM | DIASTOLIC BLOOD PRESSURE: 66 MMHG | SYSTOLIC BLOOD PRESSURE: 149 MMHG

## 2021-02-12 LAB
GLUCOSE BLDC GLUCOMTR-MCNC: 155 MG/DL (ref 70–130)
GLUCOSE BLDC GLUCOMTR-MCNC: 158 MG/DL (ref 70–130)

## 2021-02-12 PROCEDURE — 63710000001 INSULIN LISPRO (HUMAN) PER 5 UNITS: Performed by: NURSE PRACTITIONER

## 2021-02-12 PROCEDURE — 82962 GLUCOSE BLOOD TEST: CPT

## 2021-02-12 RX ADMIN — INSULIN LISPRO 2 UNITS: 100 INJECTION, SOLUTION INTRAVENOUS; SUBCUTANEOUS at 07:56

## 2021-02-12 RX ADMIN — INSULIN LISPRO 2 UNITS: 100 INJECTION, SOLUTION INTRAVENOUS; SUBCUTANEOUS at 11:32

## 2021-02-12 RX ADMIN — PANTOPRAZOLE SODIUM 40 MG: 40 TABLET, DELAYED RELEASE ORAL at 07:47

## 2021-02-12 NOTE — DISCHARGE SUMMARY
[facility]    Date of Discharge:  2/12/2021    PCP: Edil Gilliam MD    Discharge Diagnosis:   Active Hospital Problems    Diagnosis  POA   • **Dementia with behavioral disturbance (CMS/formerly [facility]) [F03.91]  Yes   • Type 2 diabetes mellitus with hyperglycemia, without long-term current use of insulin (CMS/formerly [facility]) [E11.65]  Yes   • History of CVA (cerebrovascular accident) [Z86.73]  Not Applicable   • PVD (peripheral vascular disease) (CMS/formerly [facility]) [I73.9]  Yes   • Paroxysmal atrial fibrillation (CMS/formerly [facility]) [I48.0]  Yes   • GERD (gastroesophageal reflux disease) [K21.9]  Yes   • HLD (hyperlipidemia) [E78.5]  Yes   • COPD (chronic obstructive pulmonary disease) (CMS/formerly [facility]) [J44.9]  Yes      Resolved Hospital Problems   No resolved problems to display.          Consults     Date and Time Order Name Status Description    2/7/2021 0220 Inpatient Psychiatrist Consult Completed     2/6/2021 9199 [facility] (on-call MD unless specified) Details Completed         Hospital Course  86-year-old male, admitted to the hospital, patient does have dementia with behavioral disturbance, patient was found to have underlying UTI, he was initially started on IV antibiotics, later on cultures isolated yeast, likely a colonizer.  Patient has remained nonverbal and mostly demonstrates altered mental status throughout the hospital stay.  He also has poorly controlled diabetes mellitus, Lantus was temporarily held due to hypoglycemia.  Patient does have history of CVA.  He also has history of paroxysmal atrial fibrillation and hypertension.  Case management has coordinated the care with hospice, patient will go back to nursing home to continue hospice care.  I have seen and examined patient at bedside, total time spent on discharge day management is more than 30 minutes.        Condition on Discharge: Improved.     Temp:  [98.4 °F (36.9 °C)-99 °F (37.2 °C)] 98.4 °F (36.9 °C)  Heart Rate:  [57-71]  71  Resp:  [16] 16  BP: (149-168)/(66-71) 149/66  Body mass index is 27.1 kg/m².    Physical Exam   General:    Altered mental status  Head and ENT: normocephalic and atraumatic.  Lungs: symmetric expansion and equal air entry bilaterally.  Heart: regular rate, rhythm, no murmurs.  Abdomen: soft, nontender, bowel sounds present.  Extremities:  No clubbing, cyanosis or edema.  Neurologic:   unable to evaluate because of altered mental status  Psychiatry:   unable to evaluate because of altered mental status  Skin:  Warm and no rash.    Disposition: Hospice/Home       Discharge Medications      Continue These Medications      Instructions Start Date   amLODIPine 5 MG tablet  Commonly known as: NORVASC   5 mg, Oral, Daily      aspirin 81 MG chewable tablet   81 mg, Oral, Daily      atorvastatin 20 MG tablet  Commonly known as: LIPITOR   20 mg, Oral, Nightly      clopidogrel 75 MG tablet  Commonly known as: PLAVIX   75 mg, Oral, Nightly      cyproheptadine 2 MG/5ML syrup   2 mg, Oral, Every 12 Hours      docusate sodium 100 MG capsule  Commonly known as: COLACE   100 mg, Oral, 2 Times Daily      insulin aspart 100 UNIT/ML solution pen-injector sc pen  Commonly known as: novoLOG FLEXPEN   4 Units, Subcutaneous, 3 Times Daily With Meals      Lantus SoloStar 100 UNIT/ML injection pen  Generic drug: Insulin Glargine   24 Units, Subcutaneous, 2 Times Daily      metoprolol succinate  MG 24 hr tablet  Commonly known as: TOPROL-XL   100 mg, Oral, 2 times daily      mirtazapine 15 MG tablet  Commonly known as: REMERON   7.5 mg, Oral, Nightly      omeprazole 40 MG capsule  Commonly known as: priLOSEC   40 mg, Oral, Daily      QUEtiapine 25 MG tablet  Commonly known as: SEROquel   25 mg, Oral, Nightly      tamsulosin 0.4 MG capsule 24 hr capsule  Commonly known as: FLOMAX   1 capsule, Oral, Daily      vitamin B-12 500 MCG tablet  Commonly known as: CYANOCOBALAMIN   1,000 mcg, Oral, Daily              Additional Instructions  for the Follow-ups that You Need to Schedule     Discharge Follow-up with Specialty: Follow-up with hospice upon discharge.; 1 Week   As directed      Specialty: Follow-up with hospice upon discharge.    Follow Up: 1 Week            Contact information for follow-up providers     Edil Gilliam MD .    Specialty: Family Medicine  Contact information:  [address]  [facility] [address]  [phone]                   Contact information for after-discharge care     Destination     [facility] .    Service: Skilled Nursing  Contact information:  [address] [facility] [address] [phone]                               Luis Miguel Braswell MD  02/12/21  11:02 EST    Discharge time spent greater than 30 minutes.

## 2021-02-12 NOTE — PLAN OF CARE
Problem: Adult Inpatient Plan of Care  Goal: Plan of Care Review  Outcome: Ongoing, Progressing  Flowsheets (Taken 2/11/2021 1931)  Progress: no change  Plan of Care Reviewed With: patient  Outcome Summary: Patient's code status updated per patient's daughter approval after speaking with [facility] to discuss discharge plan for tomorrow. He has preferred to not be bothered today and would not take any PO medications this shift. No episodes of hypoglycemia. RN spoke with patient's daughter regarding patient's day and she was thankful for the update. MD aware that patient's BP is slightly elevated and patient's refusal to take medication. Will continue to monitor.

## 2021-02-12 NOTE — PLAN OF CARE
Goal Outcome Evaluation:  Plan of Care Reviewed With: patient  Progress: no change  Outcome Summary: Pt rested well. Plan for D/C today. Continue to monitor.

## 2021-02-12 NOTE — PROGRESS NOTES
Case Management Discharge Note      Final Note: Discharged to [facility], LTC medicaid bed with [facility] to follow. [facility] to provide transportation today. LYNN Crowley Rn, CCP.    Provided Post Acute Provider List?: N/A    Selected Continued Care - Admitted Since 2/6/2021     Destination Coordination complete    Service Provider Selected Services Address Phone Fax Patient Preferred    [facility] [address] [place] [id number] [phone] --          Durable Medical Equipment    No services have been selected for the patient.              Dialysis/Infusion    No services have been selected for the patient.              Home Medical Care Coordination complete    Service Provider Selected Services Address Phone Fax Patient Preferred    [facility]  Home Hospice [address], [place] [address] [phone] --          Therapy    No services have been selected for the patient.              Community Resources    No services have been selected for the patient.                  Transportation Services  Ambulance: [facility]    Final Discharge Disposition Code: 50 - home with hospice

## 2021-02-12 NOTE — PROGRESS NOTES
Discharge Planning Assessment  [facility]     Patient Name: Jn Beauchamp  MRN: [id number]  Today's Date: 2/12/2021    Admit Date: 2/6/2021    Discharge Needs Assessment    No documentation.       Discharge Plan     Row Name 02/12/21 1200       Plan    Plan Comments  Placed call to Oksana/daughter about the discharge time. Oksana is agreeable to discharge plans. [facility] to provide the transportation today at 13:00. Discharge packet given to RN. Per Emely/Mraio Shelton no additional covid test is required and they can accept him back to his LTC medicaid bed with [facility] to follow today. Notified Sherly/Ara/RN of discharge and they will follow. LYNN Crowley Rn, CCP.    Final Discharge Disposition Code  50 - home with hospice    Final Note  Discharged to [facility], LTC medicaid bed with [facility] to follow. [facility] to provide transportation today. LYNN Crowley Rn, CCP.        Continued Care and Services - Admitted Since 2/6/2021     Destination Coordination complete    Service Provider Request Status Selected Services Address Phone Fax Patient Preferred    [facility]   Selected Skilled Nursing [address] [place] [address] [phone] --          Home Medical Care Coordination complete    Service Provider Request Status Selected Services Address Phone Fax Patient Preferred    [facility]   [facility] [address] [facility] [address] [phone] --              Expected Discharge Date and Time     Expected Discharge Date Expected Discharge Time    Feb 12, 2021         Demographic Summary    No documentation.       Functional Status    No documentation.       Psychosocial    No documentation.       Abuse/Neglect    No documentation.       Legal    No documentation.       Substance Abuse    No documentation.       Patient Forms    No documentation.           Hillary Crowley, LAURA

## 2024-03-18 NOTE — PLAN OF CARE
Goal Outcome Evaluation:            Small open wound at the inferior aspect of the medial incision.  Wound is full thickness.  Minimal slough present.  No sign of infection.  Sharp debridement is needed.  Implement [place] dressings.